# Patient Record
Sex: FEMALE | Race: BLACK OR AFRICAN AMERICAN | Employment: FULL TIME | URBAN - METROPOLITAN AREA
[De-identification: names, ages, dates, MRNs, and addresses within clinical notes are randomized per-mention and may not be internally consistent; named-entity substitution may affect disease eponyms.]

---

## 2020-10-30 ENCOUNTER — TRANSCRIBE ORDER (OUTPATIENT)
Dept: SCHEDULING | Age: 50
End: 2020-10-30

## 2020-10-30 DIAGNOSIS — S86.019A ACHILLES TENDON TEAR: Primary | ICD-10-CM

## 2020-11-18 ENCOUNTER — HOSPITAL ENCOUNTER (OUTPATIENT)
Dept: MRI IMAGING | Age: 50
Discharge: HOME | End: 2020-11-18
Attending: PODIATRIST
Payer: COMMERCIAL

## 2020-11-18 DIAGNOSIS — S86.019A ACHILLES TENDON TEAR: ICD-10-CM

## 2020-11-18 PROCEDURE — 73721 MRI JNT OF LWR EXTRE W/O DYE: CPT

## 2021-03-01 ENCOUNTER — OFFICE VISIT (OUTPATIENT)
Dept: PRIMARY CARE CLINIC | Age: 51
End: 2021-03-01
Payer: COMMERCIAL

## 2021-03-01 VITALS
RESPIRATION RATE: 18 BRPM | TEMPERATURE: 97.5 F | HEIGHT: 65 IN | SYSTOLIC BLOOD PRESSURE: 120 MMHG | BODY MASS INDEX: 43.15 KG/M2 | WEIGHT: 259 LBS | DIASTOLIC BLOOD PRESSURE: 79 MMHG | OXYGEN SATURATION: 97 % | HEART RATE: 67 BPM

## 2021-03-01 DIAGNOSIS — E66.01 MORBIDLY OBESE (HCC): ICD-10-CM

## 2021-03-01 DIAGNOSIS — Z12.11 COLON CANCER SCREENING: ICD-10-CM

## 2021-03-01 DIAGNOSIS — S86.012D COMPLETE RUPTURE OF ACHILLES TENDON, LEFT, SUBSEQUENT ENCOUNTER: Primary | ICD-10-CM

## 2021-03-01 DIAGNOSIS — Z12.31 ENCOUNTER FOR SCREENING MAMMOGRAM FOR MALIGNANT NEOPLASM OF BREAST: ICD-10-CM

## 2021-03-01 DIAGNOSIS — Z01.818 PRE-OP EXAM: ICD-10-CM

## 2021-03-01 DIAGNOSIS — Z11.59 NEED FOR HEPATITIS C SCREENING TEST: ICD-10-CM

## 2021-03-01 PROCEDURE — 99204 OFFICE O/P NEW MOD 45 MIN: CPT | Performed by: INTERNAL MEDICINE

## 2021-03-01 RX ORDER — DICLOFENAC SODIUM 75 MG/1
1 TABLET, DELAYED RELEASE ORAL 2 TIMES DAILY
COMMUNITY
Start: 2021-02-15 | End: 2022-02-22

## 2021-03-01 NOTE — PROGRESS NOTES
Written by Davonte Ruiz, as dictated by Dr. Zhanna Ross MD.    Bibi Melendez (: 1970) is a 50 y.o. female, new patient, here for evaluation of the following chief complaint(s):  Establish Care (labs) and Pre-op Exam       ASSESSMENT/PLAN:  1. Complete rupture of Achilles tendon, left, subsequent encounter   She is scheduled for surgery on 21 and I have cleared her for surgery.    2. Pre-op exam  -     METABOLIC PANEL, COMPREHENSIVE; Future  -     CBC W/O DIFF; Future  -     LIPID PANEL; Future  -     TSH 3RD GENERATION; Future   She is scheduled for 21 and I have cleared her for surgery. I instructed her to stop taking diclofenac 7 days before her procedure. She can take Tylenol instead PRN. I let her know that any pain medications prescribed to her after the surgery may make her constipated, so she should take a stool softener along with them.     3. Morbidly obese (HCC)  She would like to work on weight loss so I recommended she start by cutting back on her refined carbohydrates such as bread, pasta, rice, and sweets. It will be difficult for her to be active while she is recovering from surgery.     4. Need for hepatitis C screening test  -     HEPATITIS C AB; Future  -     HEPATITIS C AB  I ordered hepatitis C labs for health maintenance.      5. Encounter for screening mammogram for malignant neoplasm of breast  -     [facility] 3D JASVIR W MAMMO BI SCREENING INCL CAD; Future  I ordered a mammogram for health maintenance.      6. Colon cancer screening  -     OCCULT BLOOD IMMUNOASSAY,DIAGNOSTIC; Future  I ordered a stool test for health maintenance.     No follow-ups on file.      SUBJECTIVE/OBJECTIVE:  HPI  Pt presents today to establish care, having previously been under the care of Dr. Devi Weathers several years ago. She has not seen a PCP in years and is not taking any medications besides diclofenac for pain in her R foot.    She is also here for a pre-op exam for repair of her R  Achilles tendon. She has been having trouble with this as a bone spur in her heel is rubbing against it and damaging it. She has been around her current weight of 259 lb for years, and she would like to work on losing weight. Her greatest struggles with food are bread, potatoes (i.e. french fries), and soda. She had her last Pap smear 2-3 years ago with normal findings and her last mammogram was around the same time. She has fhx of breast cancer (grandmother and aunt). She has not had a colonoscopy done. She does not get flu vaccines but her most recent Tdap vaccine was in 2019 when she had a puncture wound in her hand from a nail. Current Outpatient Medications on File Prior to Visit   Medication Sig Dispense Refill    diclofenac EC (VOLTAREN) 75 mg EC tablet Take 1 Tab by mouth two (2) times a day. No current facility-administered medications on file prior to visit.         No Known Allergies    Past Medical History:   Diagnosis Date    UTI (urinary tract infection)        Past Surgical History:   Procedure Laterality Date    HX HYSTERECTOMY      [place] BREAST SURGERY PROCEDURE UNLISTED  1998    breast reduction       Family History   Problem Relation Age of Onset    Cancer Maternal Aunt     Heart Disease Maternal Grandmother     Cancer Maternal Grandmother        Social History     Socioeconomic History    Marital status: SINGLE     Spouse name: Not on file    Number of children: Not on file    Years of education: Not on file    Highest education level: Not on file   Occupational History    Not on file   Social Needs    Financial resource strain: Not on file    Food insecurity     Worry: Not on file     Inability: Not on file    Transportation needs     Medical: Not on file     Non-medical: Not on file   Tobacco Use    Smoking status: Never Smoker    Smokeless tobacco: Never Used   Substance and Sexual Activity    Alcohol use: Yes     Frequency: Monthly or less     Drinks per session: 1 or 2     Binge frequency: Never    Drug use: Never    Sexual activity: Not on file   Lifestyle    Physical activity     Days per week: Not on file     Minutes per session: Not on file    Stress: Not on file   Relationships    Social connections     Talks on phone: Not on file     Gets together: Not on file     Attends Protestant service: Not on file     Active member of club or organization: Not on file     Attends meetings of clubs or organizations: Not on file     Relationship status: Not on file    Intimate partner violence     Fear of current or ex partner: Not on file     Emotionally abused: Not on file     Physically abused: Not on file     Forced sexual activity: Not on file   Other Topics Concern    Not on file   Social History Narrative    Not on file       No visits with results within 3 Month(s) from this visit.    Latest known visit with results is:   Admission on 04/24/2013, Discharged on 04/24/2013   Component Date Value Ref Range Status    Color 04/24/2013 YELLOW   Final    Appearance 04/24/2013 CLEAR   Final    Specific gravity 04/24/2013 1.012  1.003 - 1.030   Final    pH (UA) 04/24/2013 6.0  5.0 - 8.0   Final    Protein 04/24/2013 NEGATIVE   NEGATIVE MG/DL Final    Glucose 04/24/2013 NEGATIVE   NEGATIVE MG/DL Final    Ketone 04/24/2013 NEGATIVE   NEGATIVE MG/DL Final    Bilirubin 04/24/2013 NEGATIVE   NEGATIVE Final    Blood 04/24/2013 SMALL* NEGATIVE Final    Urobilinogen 04/24/2013 1.0  0.2 - 1.0 EU/DL Final    Nitrites 04/24/2013 NEGATIVE   NEGATIVE Final    Leukocyte Esterase 04/24/2013 NEGATIVE   NEGATIVE Final    UA:UC IF INDICATED 04/24/2013 CULTURE NOT INDICATED BY UA RESULT   Final    WBC 04/24/2013 0-4  0 - 4 /HPF Final    RBC 04/24/2013 0-3  0 - 5 /HPF Final    Epithelial cells 04/24/2013 0-5  0 - 5 /LPF Final    Bacteria 04/24/2013 NEGATIVE   NEGATIVE /HPF Final    Hyaline cast 04/24/2013 0-2  0 - 2 Final     Review of Systems   Constitutional: Negative for activity change, fatigue and unexpected weight change. HENT: Negative for congestion, hearing loss, rhinorrhea and sore throat. Eyes: Negative for discharge. Respiratory: Negative for cough, chest tightness and shortness of breath. Cardiovascular: Negative for leg swelling. Gastrointestinal: Negative for abdominal pain, constipation and diarrhea. Genitourinary: Negative for dysuria, flank pain, frequency and urgency. Musculoskeletal: Negative for arthralgias, back pain and myalgias. Skin: Negative for color change and rash. Neurological: Negative for dizziness, light-headedness and headaches. Psychiatric/Behavioral: Negative for dysphoric mood and sleep disturbance. The patient is not nervous/anxious. Visit Vitals  /79 (BP 1 Location: Left upper arm, BP Patient Position: Sitting)   Pulse 67   Temp 97.5 °F (36.4 °C) (Temporal)   Resp 18   Ht 5' 5\" (1.651 m)   Wt 259 lb (117.5 kg)   SpO2 97%   BMI 43.10 kg/m²     Physical Exam  Vitals signs and nursing note reviewed. Constitutional:       General: She is not in acute distress. Appearance: Normal appearance. She is normal weight. She is not diaphoretic. HENT:      Right Ear: Tympanic membrane, ear canal and external ear normal.      Left Ear: Tympanic membrane, ear canal and external ear normal.      Mouth/Throat:      Mouth: Mucous membranes are moist.      Pharynx: Oropharynx is clear. Eyes:      General:         Right eye: No discharge. Left eye: No discharge. Extraocular Movements: Extraocular movements intact. Conjunctiva/sclera: Conjunctivae normal.   Neck:      Thyroid: No thyromegaly. Cardiovascular:      Rate and Rhythm: Normal rate and regular rhythm. Pulses: Normal pulses. Dorsalis pedis pulses are 2+ on the right side and 2+ on the left side. Pulmonary:      Effort: Pulmonary effort is normal.      Breath sounds: Normal breath sounds. No wheezing.    Abdominal: General: Bowel sounds are normal. There is no distension. Palpations: Abdomen is soft. Tenderness: There is no abdominal tenderness. Musculoskeletal:      Right lower leg: No edema. Left lower leg: No edema. Comments: B/L knees without crepitus   Lymphadenopathy:      Cervical: No cervical adenopathy. Neurological:      Deep Tendon Reflexes:      Reflex Scores:       Patellar reflexes are 2+ on the right side and 2+ on the left side. Psychiatric:         Mood and Affect: Mood and affect normal.         An electronic signature was used to authenticate this note.   -- Miya Chapman

## 2021-03-02 LAB
ALBUMIN SERPL-MCNC: 4.6 G/DL (ref 3.8–4.8)
ALBUMIN/GLOB SERPL: 1.6 {RATIO} (ref 1.2–2.2)
ALP SERPL-CCNC: 90 IU/L (ref 39–117)
ALT SERPL-CCNC: 16 IU/L (ref 0–32)
AST SERPL-CCNC: 20 IU/L (ref 0–40)
BILIRUB SERPL-MCNC: 0.6 MG/DL (ref 0–1.2)
BUN SERPL-MCNC: 13 MG/DL (ref 6–24)
BUN/CREAT SERPL: 21 (ref 9–23)
CALCIUM SERPL-MCNC: 10.2 MG/DL (ref 8.7–10.2)
CHLORIDE SERPL-SCNC: 105 MMOL/L (ref 96–106)
CHOLEST SERPL-MCNC: 200 MG/DL (ref 100–199)
CO2 SERPL-SCNC: 22 MMOL/L (ref 20–29)
CREAT SERPL-MCNC: 0.63 MG/DL (ref 0.57–1)
ERYTHROCYTE [DISTWIDTH] IN BLOOD BY AUTOMATED COUNT: 13.8 % (ref 11.7–15.4)
GLOBULIN SER CALC-MCNC: 2.8 G/DL (ref 1.5–4.5)
GLUCOSE SERPL-MCNC: 93 MG/DL (ref 65–99)
HCT VFR BLD AUTO: 39.9 % (ref 34–46.6)
HCV AB S/CO SERPL IA: <0.1 S/CO RATIO (ref 0–0.9)
HDLC SERPL-MCNC: 40 MG/DL
HGB BLD-MCNC: 13.1 G/DL (ref 11.1–15.9)
LDLC SERPL CALC-MCNC: 140 MG/DL (ref 0–99)
MCH RBC QN AUTO: 25.4 PG (ref 26.6–33)
MCHC RBC AUTO-ENTMCNC: 32.8 G/DL (ref 31.5–35.7)
MCV RBC AUTO: 78 FL (ref 79–97)
PLATELET # BLD AUTO: 251 X10E3/UL (ref 150–450)
POTASSIUM SERPL-SCNC: 4.1 MMOL/L (ref 3.5–5.2)
PROT SERPL-MCNC: 7.4 G/DL (ref 6–8.5)
RBC # BLD AUTO: 5.15 X10E6/UL (ref 3.77–5.28)
SODIUM SERPL-SCNC: 142 MMOL/L (ref 134–144)
TRIGL SERPL-MCNC: 111 MG/DL (ref 0–149)
TSH SERPL DL<=0.005 MIU/L-ACNC: 1.53 UIU/ML (ref 0.45–4.5)
VLDLC SERPL CALC-MCNC: 20 MG/DL (ref 5–40)
WBC # BLD AUTO: 5.2 X10E3/UL (ref 3.4–10.8)

## 2021-03-06 NOTE — PROGRESS NOTES
Nicole Jimenes, hope you are doing well. Your blood report is back and Hemoglobin came back in normal range. Cholesterol came borderline elevated. I would suggest low carb diet for now as you won`t be able to do exercise after your surgery. Let me know how surgery goes?

## 2021-04-28 ENCOUNTER — HOSPITAL ENCOUNTER (OUTPATIENT)
Dept: MAMMOGRAPHY | Age: 51
Discharge: HOME | End: 2021-04-28
Attending: INTERNAL MEDICINE
Payer: COMMERCIAL

## 2021-04-28 DIAGNOSIS — Z12.31 ENCOUNTER FOR SCREENING MAMMOGRAM FOR MALIGNANT NEOPLASM OF BREAST: ICD-10-CM

## 2021-04-28 PROCEDURE — 77063 BREAST TOMOSYNTHESIS BI: CPT

## 2022-02-22 ENCOUNTER — OFFICE VISIT (OUTPATIENT)
Dept: PRIMARY CARE CLINIC | Age: 52
End: 2022-02-22
Payer: COMMERCIAL

## 2022-02-22 VITALS
BODY MASS INDEX: 42.32 KG/M2 | OXYGEN SATURATION: 98 % | SYSTOLIC BLOOD PRESSURE: 119 MMHG | HEART RATE: 74 BPM | DIASTOLIC BLOOD PRESSURE: 77 MMHG | HEIGHT: 65 IN | WEIGHT: 254 LBS | RESPIRATION RATE: 18 BRPM | TEMPERATURE: 97.5 F

## 2022-02-22 DIAGNOSIS — E66.01 OBESITY, CLASS III, BMI 40-49.9 (MORBID OBESITY) (HCC): ICD-10-CM

## 2022-02-22 DIAGNOSIS — Z01.818 PRE-OP EXAM: ICD-10-CM

## 2022-02-22 DIAGNOSIS — M67.90 TENDINOPATHY: ICD-10-CM

## 2022-02-22 DIAGNOSIS — M77.31 HEEL SPUR, RIGHT: Primary | ICD-10-CM

## 2022-02-22 PROCEDURE — 99213 OFFICE O/P EST LOW 20 MIN: CPT | Performed by: INTERNAL MEDICINE

## 2022-02-22 NOTE — PROGRESS NOTES
Preoperative Evaluation    Date of Exam: 2022    Denia Jeffers is a 46 y.o. female (:1970) who presents for preoperative evaluation. Latex Allergy: no    Medical History:     Past Medical History:   Diagnosis Date    UTI (urinary tract infection)      Allergies:   No Known Allergies   Medications:     No current outpatient medications on file. No current facility-administered medications for this visit. Surgical History:     Past Surgical History:   Procedure Laterality Date    HX BREAST REDUCTION Bilateral     HX HYSTERECTOMY      [place] BREAST SURGERY PROCEDURE UNLISTED  1998    breast reduction     Social History:     Social History     Socioeconomic History    Marital status: SINGLE   Tobacco Use    Smoking status: Never Smoker    Smokeless tobacco: Never Used   Vaping Use    Vaping Use: Never used   Substance and Sexual Activity    Alcohol use: Yes    Drug use: Never       Anesthesia Complications: None  History of abnormal bleeding : None  History of Blood Transfusions: no  Health Care Directive or Living Will: no    Objective:     ROS:   . No dyspnea or chest pain on exertion. No abdominal pain, change in bowel habits, black or bloody stools. No urinary tract symptoms. GYN ROS:  no abnormal bleeding, pelvic pain or discharge\",\"no breast pain . No neurological complaints. OBJECTIVE:   The patient appears well, alert, oriented x 3, in no distress. Visit Vitals  /77 (BP 1 Location: Left upper arm, BP Patient Position: Sitting)   Pulse 74   Temp 97.5 °F (36.4 °C) (Temporal)   Resp 18   Ht 5' 5\" (1.651 m)   Wt 254 lb (115.2 kg)   SpO2 98%   BMI 42.27 kg/m²     HEENT:ENT normal.  Neck supple. No adenopathy or thyromegaly. PAUL. Chest: Lungs are clear, good air entry, no wheezes, rhonchi or rales. Cardiovascular: S1 and S2 normal, no murmurs, regular rate and rhythm. Abdomen: soft without tenderness, guarding, mass or organomegaly.    Extremities: show no edema, normal peripheral pulses. Neurological: is normal, no focal findings. DIAGNOSTICS:   3. Labs: CBC & CMP ordered. IMPRESSION:   Diagnoses and all orders for this visit:    Heel spur, right  Scheduled for Right calcaneus osteotomy on March 11th. Tendinopathy  Scheduled for Javan deformity correction same day     Pre-op exam  Cleared for the procedure. No ASA, NSAIDS 5 days before the procedure. -     METABOLIC PANEL, COMPREHENSIVE; Future  -     CBC W/O DIFF; Future      Obesity, Class III, BMI 40-49.9 (morbid obesity) ([facility])   Recommended cutting down on Carbs and sweets.         Lauren Harris MD   2/22/2022

## 2022-02-23 LAB
ALBUMIN SERPL-MCNC: 4.6 G/DL (ref 3.8–4.9)
ALBUMIN/GLOB SERPL: 1.5 {RATIO} (ref 1.2–2.2)
ALP SERPL-CCNC: 105 IU/L (ref 44–121)
ALT SERPL-CCNC: 18 IU/L (ref 0–32)
AST SERPL-CCNC: 19 IU/L (ref 0–40)
BILIRUB SERPL-MCNC: 0.7 MG/DL (ref 0–1.2)
BUN SERPL-MCNC: 11 MG/DL (ref 6–24)
BUN/CREAT SERPL: 16 (ref 9–23)
CALCIUM SERPL-MCNC: 10.4 MG/DL (ref 8.7–10.2)
CHLORIDE SERPL-SCNC: 105 MMOL/L (ref 96–106)
CO2 SERPL-SCNC: 25 MMOL/L (ref 20–29)
CREAT SERPL-MCNC: 0.69 MG/DL (ref 0.57–1)
ERYTHROCYTE [DISTWIDTH] IN BLOOD BY AUTOMATED COUNT: 13.5 % (ref 11.7–15.4)
GLOBULIN SER CALC-MCNC: 3 G/DL (ref 1.5–4.5)
GLUCOSE SERPL-MCNC: 95 MG/DL (ref 65–99)
HCT VFR BLD AUTO: 38.1 % (ref 34–46.6)
HGB BLD-MCNC: 12.3 G/DL (ref 11.1–15.9)
MCH RBC QN AUTO: 25.5 PG (ref 26.6–33)
MCHC RBC AUTO-ENTMCNC: 32.3 G/DL (ref 31.5–35.7)
MCV RBC AUTO: 79 FL (ref 79–97)
PLATELET # BLD AUTO: 257 X10E3/UL (ref 150–450)
POTASSIUM SERPL-SCNC: 4.1 MMOL/L (ref 3.5–5.2)
PROT SERPL-MCNC: 7.6 G/DL (ref 6–8.5)
RBC # BLD AUTO: 4.83 X10E6/UL (ref 3.77–5.28)
SODIUM SERPL-SCNC: 145 MMOL/L (ref 134–144)
WBC # BLD AUTO: 6.5 X10E3/UL (ref 3.4–10.8)

## 2022-02-24 NOTE — PROGRESS NOTES
Please let her know blood work showed she is dehydrated. Needs to drink 7-8 glasses of water daily. Also, we need to fax this blood report to surgeon office.

## 2022-04-04 ENCOUNTER — TRANSCRIBE ORDER (OUTPATIENT)
Dept: SCHEDULING | Age: 52
End: 2022-04-04

## 2022-04-04 DIAGNOSIS — Z12.31 SCREENING MAMMOGRAM FOR HIGH-RISK PATIENT: Primary | ICD-10-CM

## 2022-04-05 ENCOUNTER — OFFICE VISIT (OUTPATIENT)
Dept: PRIMARY CARE CLINIC | Age: 52
End: 2022-04-05
Payer: COMMERCIAL

## 2022-04-05 VITALS
HEART RATE: 70 BPM | TEMPERATURE: 97.1 F | RESPIRATION RATE: 20 BRPM | SYSTOLIC BLOOD PRESSURE: 114 MMHG | OXYGEN SATURATION: 98 % | DIASTOLIC BLOOD PRESSURE: 74 MMHG

## 2022-04-05 DIAGNOSIS — N83.8 OVARIAN MASS, RIGHT: ICD-10-CM

## 2022-04-05 DIAGNOSIS — R10.2 PELVIC PAIN: Primary | ICD-10-CM

## 2022-04-05 PROCEDURE — 99213 OFFICE O/P EST LOW 20 MIN: CPT | Performed by: INTERNAL MEDICINE

## 2022-04-05 NOTE — PROGRESS NOTES
Daniela Mcfarlane is a 46 y.o.  female and presents with     Chief Complaint   Patient presents with    Abdominal Pain     lower abdominal pain x 2 months      Pt has been having rt lower tenderness for past 2 months. Pt has to lift boxes at work and so she thought it could be related to that . However she had foot surgery on March 11 and has been home and still continues to get the pain in rt lower quadrant. It is  A constant pain  No nchills or fevers. NO constipation. Has BM once or twice daily. No nausea, vomiting  Had hysterectomy  And left oophorectomy In 2009 for uterine fibroid . HOwever subsequent ultrasound showed rt ovarian mass. Pt does not remember if she went for follow up or had MRI as recommended             Past Medical History:   Diagnosis Date    UTI (urinary tract infection)      Past Surgical History:   Procedure Laterality Date    HX BREAST REDUCTION Bilateral 1988    HX HYSTERECTOMY  1993    DE BREAST SURGERY PROCEDURE UNLISTED  1998    breast reduction     No current outpatient medications on file. No current facility-administered medications for this visit. Health Maintenance   Topic Date Due    Colorectal Cancer Screening Combo  Never done    Shingrix Vaccine Age 49> (1 of 2) Never done    Flu Vaccine (Season Ended) 10/21/2025 (Originally 9/1/2022)    Depression Screen  02/22/2023    Breast Cancer Screen Mammogram  04/28/2023    Lipid Screen  03/01/2026    DTaP/Tdap/Td series (2 - Td or Tdap) 09/18/2029    Hepatitis C Screening  Completed    COVID-19 Vaccine  Completed    Pneumococcal 0-64 years  Aged [employer] History   Administered Date(s) Administered    COVID-19, Pfizer Purple top, DILUTE for use, 12+ yrs, 30mcg/0.3mL dose 04/25/2021, 05/16/2021, 01/30/2022    Tdap 09/18/2019     No LMP recorded. Patient has had a hysterectomy.         Allergies and Intolerances:   No Known Allergies    Family History:   Family History   Problem Relation Age of Onset    Cancer Maternal Aunt     Breast Cancer Maternal Aunt         M. Aunt diagnosed in her early 63's    Heart Disease Maternal Grandmother     Cancer Maternal Grandmother     Breast Cancer Maternal Grandmother         Age at diagnosis unknown        Social History:   She  reports that she has never smoked. She has never used smokeless tobacco.  She  reports current alcohol use. Review of Systems:   General: negative for - chills, fatigue, fever, weight change  Psych: negative for - anxiety, depression, irritability or mood swings  ENT: negative for - headaches, hearing change, nasal congestion, oral lesions, sneezing or sore throat  Heme/ Lymph: negative for - bleeding problems, bruising, pallor or swollen lymph nodes  Endo: negative for - hot flashes, polydipsia/polyuria or temperature intolerance  Resp: negative for - cough, shortness of breath or wheezing  CV: negative for - chest pain, edema or palpitations  GI: negative for - abdominal pain, change in bowel habits, constipation, diarrhea or nausea/vomiting  : negative for - dysuria, hematuria, incontinence, pelvic pain or vulvar/vaginal symptoms  MSK: negative for - joint pain, joint swelling or muscle pain  Neuro: negative for - confusion, headaches, seizures or weakness  Derm: negative for - dry skin, hair changes, rash or skin lesion changes          Physical:   Vitals:   Vitals:    04/05/22 1244   BP: 114/74   Pulse: 70   Resp: 20   Temp: 97.1 °F (36.2 °C)   TempSrc: Temporal   SpO2: 98%           Exam:   HEENT- atraumatic,normocephalic, awake, oriented, well nourished  Neck - supple,no enlarged lymph nodes, no JVD, no thyromegaly  Chest- CTA, no rhonchi, no crackles  Heart- rrr, no murmurs / gallop/rub  Abdomen- soft,BS+,NT, no hepatosplenomegaly, rt sided pelvic tenderenss ++  Ext - no c/c/edema   Neuro- no focal deficits. Power 5/5 all extremities  Skin - warm,dry, no obvious rashes.           Review of Data:   LABS:   Lab Results Component Value Date/Time    WBC 6.5 02/22/2022 04:12 AM    HGB 12.3 02/22/2022 04:12 AM    HCT 38.1 02/22/2022 04:12 AM    PLATELET 868 53/44/1728 04:12 AM     Lab Results   Component Value Date/Time    Sodium 145 (H) 02/22/2022 04:12 AM    Potassium 4.1 02/22/2022 04:12 AM    Chloride 105 02/22/2022 04:12 AM    CO2 25 02/22/2022 04:12 AM    Glucose 95 02/22/2022 04:12 AM    BUN 11 02/22/2022 04:12 AM    Creatinine 0.69 02/22/2022 04:12 AM     Lab Results   Component Value Date/Time    Cholesterol, total 200 (H) 03/01/2021 12:00 AM    HDL Cholesterol 40 03/01/2021 12:00 AM    LDL, calculated 140 (H) 03/01/2021 12:00 AM    Triglyceride 111 03/01/2021 12:00 AM     No components found for: GPT        Impression / Plan:        ICD-10-CM ICD-9-CM    1. Pelvic pain  R10.2 [id number] US PELV NON OBS   2. Ovarian mass, right  N83.8 620.8 US PELV NON OBS         Explained to patient risk benefits of the medications. Advised patient to stop meds if having any side effects. Pt verbalized understanding of the instructions. I have discussed the diagnosis with the patient and the intended plan as seen in the above orders. The patient has received an after-visit summary and questions were answered concerning future plans. I have discussed medication side effects and warnings with the patient as well. I have reviewed the plan of care with the patient, accepted their input and they are in agreement with the treatment goals. Reviewed plan of care. Patient has provided input and agrees with goals. Follow-up and Dispositions    · Return in about 6 weeks (around 5/17/2022), or Dr Koko Bridges.          Yudith Grimes MD

## 2022-04-11 ENCOUNTER — HOSPITAL ENCOUNTER (OUTPATIENT)
Dept: ULTRASOUND IMAGING | Age: 52
Discharge: HOME | End: 2022-04-11
Attending: INTERNAL MEDICINE
Payer: COMMERCIAL

## 2022-04-11 DIAGNOSIS — N83.8 OVARIAN MASS, RIGHT: ICD-10-CM

## 2022-04-11 DIAGNOSIS — R10.2 PELVIC PAIN: ICD-10-CM

## 2022-04-11 PROCEDURE — 76830 TRANSVAGINAL US NON-OB: CPT

## 2022-04-11 PROCEDURE — 76856 US EXAM PELVIC COMPLETE: CPT

## 2022-04-12 NOTE — PROGRESS NOTES
Right ovary is normal.  There is no cyst.  Right-sided neck pain could be muscular  If you  still have pain we may need to do a CT scan of the pelvis to rule out appendicitis.

## 2022-04-15 ENCOUNTER — TELEPHONE (OUTPATIENT)
Dept: PRIMARY CARE CLINIC | Age: 52
End: 2022-04-15

## 2022-04-15 NOTE — TELEPHONE ENCOUNTER
Returned call to patient. In mid conversation the call dropped.  Tried to call back phone goes straight to voice mail

## 2022-04-15 NOTE — TELEPHONE ENCOUNTER
Pt states she had an US done this week and has seen the results. Wants to know what the next steps are since she is still having side pain?

## 2022-05-22 PROBLEM — M17.11 OSTEOARTHRITIS OF RIGHT KNEE: Status: ACTIVE | Noted: 2022-05-22

## 2022-05-22 NOTE — PROGRESS NOTES
ASSESSMENT/PLAN:  Below is the assessment and plan developed based on review of pertinent history, physical exam, labs, studies, and medications. 1. Osteoarthritis of right knee, unspecified osteoarthritis type  -     XR KNEE RT MIN 4 V; Future      Return in about 3 months (around 8/23/2022). In discussion with the patient, we considered the numerus possible diagnoses that could be contributing to their present symptoms. We also deliberated on the extensive management options that must be considered to treat their current condition. We reviewed their accessible prior medical records, diagnostic tests, and current health and employment information. We considered how these symptoms were affecting the patient´s activities of daily living as well as employment and fitness activities. The patient had various questions regarding the possible risks, benefits, complications, morbidity and mortality regarding their diagnosis and treatment options. The patients´ comorbidities were considered, and I advocated that they consider maximizing lifestyle modification through nutrition and exercise to aid in addressing their symptoms. Shared decision making yielded an understanding to move forward with conservation treatment preferences. The patient expressed understanding that if conservative management fails to alleviate the present symptoms they will return to office for re-evaluation and consideration of additional diagnostic tests and potential surgical options. In the interim, we have recommended ice, elevation, and take prescription anti-inflammatory medications along with a physician directed home exercise program. We discussed the risks and common side effects of anti-inflammatory medications and instructed the patient to discontinue the medication and contact us if they experienced any side effects.  The patient was encouraged to discuss the possible side effects with their family physician or pharmacist prior to initiating any new medications. We discussed the fact that many of the recommended treatment options presented are significantly limited by the patient´s social determinants of health. We also reviewed the circumstances surrounding the environment that they live and work which affect a wide range of health risk. We considered the limited access to appropriate educational resources regarding proper nutrition and exercise as well as the economic and social support necessary to maintain health and wellbeing. We discussed the possibility of an injection of a steroid mixed with a local anesthetic to relieve pain and decrease inflammation in the right knee. The risks of a steroid injection which include but are not limited to cartilage damage, death of nearby bone, joint infection, nerve damage, temporary facial flushing, temporary steroid flare of pain and inflammation in the joint, temporary increase in blood sugar, tendon weakening or rupture, thinning of nearby bone (osteoporosis), thinning of skin and soft tissue around the injection site, and whitening or lightening of the skin around the injection site were reviewed at length. We specifically advised that patients with diabetes talk to their primary care to ensure they are safe for a steroid injection due to the transient increase in blood sugar associated with the injection. We discussed the chance of increased bleeding and bruising if the patient is on blood thinners or certain dietary supplements that have a blood-thinning effect. We advised patients that have an active infection, history of allergic reactions to steroids or take medications that may prohibit them from receiving a steroid injection to talk to their primary care physician before receiving and injection. We also talked about limiting the number of injections because these potential side effects increase with larger doses and repeated use.     After explaining the risks and benefits of the procedure and obtaining verbal informed consent from the patient, the proposed area for injection was confirmed with the patient. After all questions and concerns were addressed, the skin was prepped with alcohol to reduce the chances of infection. The skin was anesthetized with a topical ethylene chloride spray and a mixture of two milliliters of Depo-Medrol (40mg/ml), one milliliter of Lidocaine and one milliliter of Marcaine was injected slowly into the intra-articular space of right knee in a sterile fashion without difficulty. The needle was removed and disposed of in a sterile container. The patient tolerated the injection well and a band-aid was placed on the skin. The patient was counseled on protecting the injection area, avoiding water submersion for 2 days, icing for pain relief and looking for signs and symptoms of infection. We requested that the patient contact us if any symptoms persist greater than 48 hours after the injection. I have encouraged the patient to take an active role in their treatment by pursuing a healthy lifestyle with better nutritional choices and regular low impact exercise. We discussed that weight loss can be beneficial to relieving discomfort in the lower extremities as well as other health benefits. I have asked the patient to seek advice from their primary care physician regarding additional resources available to achieve their weight loss goals. SUBJECTIVE/OBJECTIVE:  Kareen Braga (: 1970) is a 46 y.o. female, patient,here for evaluation of the Knee Pain (right knee)  . Ms. Radha Bustamante returns today for follow-up of her right knee. An MRI a year ago that showed a medial meniscus tear as well as some patellofemoral arthritis. He reports is continue to bother intermittently. She is continue to ice and elevate as well as take some anti-inflammatory medications. She did have a steroid injection last summer which was very helpful for her.  She is working primarily on weight loss. Recovering from Achilles surgery. Reports she has a dull ache. She reports she occasionally has popping and clicking. She has difficulty kneeling and squatting. She denies any true give way. Physical Exam    Upon physical examination, the patient is well developed, well nourished, alert and oriented times three, with normal mood and affect and walks with an antalgic gait. Upon examination of the right knee, the patient is tender to palpation along the medial joint line, and has an effusion. They have crepitus of the patellofemoral joint with range of motion and discomfort with patella grind testing. The patient has discomfort with Thor´s maneuvers, and the knee is stable. They lack full flexion secondary to the effusion, but have full extension. They have 5/5 strength, and are neurovascularly intact distally. There is no erythema, warmth or skin lesions present. On examination of the contralateral extremity, the patient is nontender to palpation and has excellent range of motion, stability and strength. Imagin views of the right knee demonstrate osteoarthritis in the patellofemoral joint considerably the lateral facet. No Known Allergies    Current Outpatient Medications   Medication Sig    gabapentin (NEURONTIN) 300 mg capsule TAKE 1 CAPSULE AT HOUR OF SLEEP    ibuprofen (MOTRIN) 600 mg tablet TAKE 1 TABLET BY MOUTH THREE TIMES DAILY AFTER A MEAL     No current facility-administered medications for this visit. Past Medical History:   Diagnosis Date    UTI (urinary tract infection)        Past Surgical History:   Procedure Laterality Date    FOOT/TOES SURGERY PROC UNLISTED      HX BREAST REDUCTION Bilateral     HX HYSTERECTOMY      WA BREAST SURGERY PROCEDURE UNLISTED      breast reduction       Family History   Problem Relation Age of Onset    Cancer Maternal Aunt     Breast Cancer Maternal Aunt         M.  Aunt diagnosed in her early 63's    Heart Disease Maternal Grandmother     Cancer Maternal Grandmother     Breast Cancer Maternal Grandmother         Age at diagnosis unknown        Social History     Socioeconomic History    Marital status: SINGLE     Spouse name: Not on file    Number of children: Not on file    Years of education: Not on file    Highest education level: Not on file   Occupational History    Not on file   Tobacco Use    Smoking status: Never Smoker    Smokeless tobacco: Never Used   Vaping Use    Vaping Use: Never used   Substance and Sexual Activity    Alcohol use: Yes    Drug use: Never    Sexual activity: Not on file   Other Topics Concern    Not on file   Social History Narrative    Not on file     Social Determinants of Health     Financial Resource Strain:     Difficulty of Paying Living Expenses: Not on file   Food Insecurity:     Worried About Running Out of Food in the Last Year: Not on file    Salvatore of Food in the Last Year: Not on file   Transportation Needs:     Lack of Transportation (Medical): Not on file    Lack of Transportation (Non-Medical):  Not on file   Physical Activity:     Days of Exercise per Week: Not on file    Minutes of Exercise per Session: Not on file   Stress:     Feeling of Stress : Not on file   Social Connections:     Frequency of Communication with Friends and Family: Not on file    Frequency of Social Gatherings with Friends and Family: Not on file    Attends Buddhism Services: Not on file    Active Member of Clubs or Organizations: Not on file    Attends Club or Organization Meetings: Not on file    Marital Status: Not on file   Intimate Partner Violence:     Fear of Current or Ex-Partner: Not on file    Emotionally Abused: Not on file    Physically Abused: Not on file    Sexually Abused: Not on file   Housing Stability:     Unable to Pay for Housing in the Last Year: Not on file    Number of [place] in the Last Year: Not on file    Unstable Housing in the Last Year: Not on file       Review of Systems    No flowsheet data found. Vitals:  Ht 5' 5\" (1.651 m)   Wt 254 lb (115.2 kg)   BMI 42.27 kg/m²    Body mass index is 42.27 kg/m². An electronic signature was used to authenticate this note.   -- Chayito Rios MD

## 2022-05-23 ENCOUNTER — OFFICE VISIT (OUTPATIENT)
Dept: ORTHOPEDIC SURGERY | Age: 52
End: 2022-05-23
Payer: COMMERCIAL

## 2022-05-23 VITALS — BODY MASS INDEX: 42.32 KG/M2 | WEIGHT: 254 LBS | HEIGHT: 65 IN

## 2022-05-23 DIAGNOSIS — M17.11 OSTEOARTHRITIS OF RIGHT KNEE, UNSPECIFIED OSTEOARTHRITIS TYPE: Primary | ICD-10-CM

## 2022-05-23 PROCEDURE — 20610 DRAIN/INJ JOINT/BURSA W/O US: CPT | Performed by: ORTHOPAEDIC SURGERY

## 2022-05-23 RX ORDER — TRIAMCINOLONE ACETONIDE 40 MG/ML
40 INJECTION, SUSPENSION INTRA-ARTICULAR; INTRAMUSCULAR ONCE
Status: COMPLETED | OUTPATIENT
Start: 2022-05-23 | End: 2022-05-23

## 2022-05-23 RX ORDER — BUPIVACAINE HYDROCHLORIDE 5 MG/ML
2 INJECTION, SOLUTION EPIDURAL; INTRACAUDAL ONCE
Status: DISCONTINUED | OUTPATIENT
Start: 2022-05-23 | End: 2022-05-23

## 2022-05-23 RX ORDER — IBUPROFEN 600 MG/1
TABLET ORAL
COMMUNITY
Start: 2022-03-11

## 2022-05-23 RX ORDER — GABAPENTIN 300 MG/1
CAPSULE ORAL
COMMUNITY
Start: 2022-05-05

## 2022-05-23 RX ORDER — TRIAMCINOLONE ACETONIDE 40 MG/ML
40 INJECTION, SUSPENSION INTRA-ARTICULAR; INTRAMUSCULAR ONCE
Status: DISCONTINUED | OUTPATIENT
Start: 2022-05-23 | End: 2022-05-23

## 2022-05-23 RX ORDER — HYDROCODONE BITARTRATE AND ACETAMINOPHEN 5; 325 MG/1; MG/1
TABLET ORAL
COMMUNITY
Start: 2022-03-11 | End: 2022-05-23

## 2022-05-23 RX ADMIN — TRIAMCINOLONE ACETONIDE 40 MG: 40 INJECTION, SUSPENSION INTRA-ARTICULAR; INTRAMUSCULAR at 11:08

## 2023-02-10 ENCOUNTER — OFFICE VISIT (OUTPATIENT)
Dept: PRIMARY CARE CLINIC | Age: 53
End: 2023-02-10
Payer: COMMERCIAL

## 2023-02-10 VITALS
HEIGHT: 65 IN | RESPIRATION RATE: 18 BRPM | BODY MASS INDEX: 42.12 KG/M2 | SYSTOLIC BLOOD PRESSURE: 114 MMHG | DIASTOLIC BLOOD PRESSURE: 71 MMHG | HEART RATE: 70 BPM | WEIGHT: 252.8 LBS | TEMPERATURE: 97.5 F | OXYGEN SATURATION: 98 %

## 2023-02-10 DIAGNOSIS — E55.9 VITAMIN D DEFICIENCY: ICD-10-CM

## 2023-02-10 DIAGNOSIS — R06.83 SNORES: ICD-10-CM

## 2023-02-10 DIAGNOSIS — E66.01 MORBIDLY OBESE (HCC): ICD-10-CM

## 2023-02-10 DIAGNOSIS — E78.2 MIXED HYPERLIPIDEMIA: ICD-10-CM

## 2023-02-10 DIAGNOSIS — E04.1 THYROID NODULE: ICD-10-CM

## 2023-02-10 DIAGNOSIS — Z12.11 COLON CANCER SCREENING: ICD-10-CM

## 2023-02-10 DIAGNOSIS — Z12.31 ENCOUNTER FOR SCREENING MAMMOGRAM FOR MALIGNANT NEOPLASM OF BREAST: ICD-10-CM

## 2023-02-10 DIAGNOSIS — Z00.00 PHYSICAL EXAM: Primary | ICD-10-CM

## 2023-02-10 DIAGNOSIS — Z90.710 S/P HYSTERECTOMY: ICD-10-CM

## 2023-02-10 DIAGNOSIS — K52.9 GASTROENTERITIS: ICD-10-CM

## 2023-02-10 NOTE — PROGRESS NOTES
1. \"Have you been to the ER, urgent care clinic since your last visit? Hospitalized since your last visit? \" No    2. \"Have you seen or consulted any other health care providers outside of the [address] [place] [address] since your last visit? \" No     3. For patients aged 39-70: Has the patient had a colonoscopy / FIT/ Cologuard? No  But wants to schedule  one    If the patient is female:    4. For patients aged 41-77: Has the patient had a mammogram within the past 2 years? Yes - no Care Gap present      5. For patients aged 21-65: Has the patient had a pap smear? NA - based on age or sex     . Tj Guadarrama Chief Complaint   Patient presents with    Physical     Stomach pain was throwing up/nausea Friday to Sunday, Friday ate fried chicken and red velvet cake. Feels bloated and feels like she can have a full bowel movement. Feels gassy. Is starting to slowly feel better.

## 2023-02-10 NOTE — PROGRESS NOTES
Subjective:   46 y.o. female for Well Woman Check. No LMP recorded. Patient has had a hysterectomy. Had a nausea , vomiting and loose bowel movements last Friday after eating out . Still has bloating but symptoms are improving. She does snores at night. Social History: single partner, contraception - none. Pertinent past medical hstory: no history of HTN, DVT, CAD, DM, liver disease, migraines or smoking. Patient Active Problem List   Diagnosis Code    Osteoarthritis of right knee M17.11    Thyroid nodule E04.1    S/P hysterectomy Z90.710     Current Outpatient Medications   Medication Sig Dispense Refill    ibuprofen (MOTRIN) 600 mg tablet TAKE 1 TABLET BY MOUTH THREE TIMES DAILY AFTER A MEAL      gabapentin (NEURONTIN) 300 mg capsule TAKE 1 CAPSULE AT HOUR OF SLEEP (Patient not taking: Reported on 2/10/2023)       No Known Allergies  Past Medical History:   Diagnosis Date    UTI (urinary tract infection)      Past Surgical History:   Procedure Laterality Date    HX BREAST REDUCTION Bilateral 1988    HX HYSTERECTOMY  1993    WA UNLISTED PROCEDURE BREAST  1998    breast reduction    WA UNLISTED PROCEDURE FOOT/TOES       Family History   Problem Relation Age of Onset    Cancer Maternal Aunt     Breast Cancer Maternal Aunt         M. Aunt diagnosed in her early 63's    Heart Disease Maternal Grandmother     Cancer Maternal Grandmother     Breast Cancer Maternal Grandmother         Age at diagnosis unknown      Social History     Tobacco Use    Smoking status: Never    Smokeless tobacco: Never   Substance Use Topics    Alcohol use: Yes        ROS:  Feeling well. No dyspnea or chest pain on exertion. No abdominal pain, change in bowel habits, black or bloody stools. No urinary tract symptoms. GYN ROS: no breast pain or new or enlarging lumps on self exam, no discharge or pelvic pain. No neurological complaints.     Objective:   Visit Vitals  /71 (BP 1 Location: Left upper arm, BP Patient Position: Sitting)   Pulse 70   Temp 97.5 °F (36.4 °C) (Temporal)   Resp 18   Ht 5' 5\" (1.651 m)   Wt 252 lb 12.8 oz (114.7 kg)   SpO2 98%   BMI 42.07 kg/m²     The patient appears well, alert, oriented x 3, in no distress. ENT normal.  Neck supple. No adenopathy , + Thyroid nodule  PAUL. Lungs are clear, good air entry, no wheezes, rhonchi or rales. S1 and S2 normal, no murmurs, regular rate and rhythm. Abdomen soft without tenderness, guarding, mass or organomegaly. Extremities show no edema, normal peripheral pulses. Neurological is normal, no focal findings. BREAST EXAM: breasts appear normal, no suspicious masses, no skin or nipple changes or axillary nodes        Assessment/Plan:   well woman  mammogram  return annually or prn    ICD-10-CM ICD-9-CM    1. Physical exam  Y07.45 L16.4 METABOLIC PANEL, COMPREHENSIVE      CBC W/O DIFF      LIPID PANEL      TSH 3RD GENERATION      Complete physical exam done. 2. Colon cancer screening  Z12.11 V76.51 REFERRAL TO GASTROENTEROLOGY      OCCULT BLOOD IMMUNOASSAY,DIAGNOSTIC      OCCULT BLOOD IMMUNOASSAY,DIAGNOSTIC      3. Gastroenteritis  K52.9 558.9 Symptoms are resolving. Stay away from dairy products for next few days. 4. Mixed hyperlipidemia  E78.2 272.2 Check Lipid panel. 5. Vitamin D deficiency  E55.9 268.9 VITAMIN D, 25 HYDROXY      VITAMIN D, 25 HYDROXY      6. Morbidly obese (HCC)  E66.01 278.01 Recommended 10,000 steps/day . Low carb diet. 7. Thyroid nodule  E04.1 241.0 US THYROID/PARATHYROID/SOFT TISS      8. Snores  R06.83 786.09 SLEEP MEDICINE REFERRAL      9. S/P hysterectomy  Z90.710 V88.01 No need for Pap smear. 10. Encounter for screening mammogram for malignant neoplasm of breast  Z12.31 V76.12 KENNETH 3D JASVIR W MAMMO BI SCREENING INCL CAD      .

## 2023-02-10 NOTE — PROGRESS NOTES
Douglas Trujillo (: 1970) is a 46 y.o. female, established patient, here for evaluation of the following chief complaint(s):  No chief complaint on file. ASSESSMENT/PLAN:  Below is the assessment and plan developed based on review of pertinent history, physical exam, labs, studies, and medications. {There are no diagnoses linked to this encounter. (Refresh or delete this SmartLink)}    No follow-ups on file. SUBJECTIVE/OBJECTIVE:  HPI  Patient presents today for an office visit and a physical.            Patient Active Problem List   Diagnosis Code    Osteoarthritis of right knee M17.11        Current Outpatient Medications on File Prior to Visit   Medication Sig Dispense Refill    gabapentin (NEURONTIN) 300 mg capsule TAKE 1 CAPSULE AT HOUR OF SLEEP      ibuprofen (MOTRIN) 600 mg tablet TAKE 1 TABLET BY MOUTH THREE TIMES DAILY AFTER A MEAL       No current facility-administered medications on file prior to visit. No Known Allergies    Past Medical History:   Diagnosis Date    UTI (urinary tract infection)        Past Surgical History:   Procedure Laterality Date    FOOT/TOES SURGERY PROC UNLISTED      HX BREAST REDUCTION Bilateral     HX HYSTERECTOMY      AK BREAST SURGERY PROCEDURE UNLISTED  1998    breast reduction       Family History   Problem Relation Age of Onset    Cancer Maternal Aunt     Breast Cancer Maternal Aunt         M.  Aunt diagnosed in her early 63's    Heart Disease Maternal Grandmother     Cancer Maternal Grandmother     Breast Cancer Maternal Grandmother         Age at diagnosis unknown        Social History     Socioeconomic History    Marital status: SINGLE     Spouse name: Not on file    Number of children: Not on file    Years of education: Not on file    Highest education level: Not on file   Occupational History    Not on file   Tobacco Use    Smoking status: Never    Smokeless tobacco: Never   Vaping Use    Vaping Use: Never used   Substance and Sexual Activity    Alcohol use: Yes    Drug use: Never    Sexual activity: Not on file   Other Topics Concern    Not on file   Social History Narrative    Not on file     Social Determinants of Health     Financial Resource Strain: Not on file   Food Insecurity: Not on file   Transportation Needs: Not on file   Physical Activity: Not on file   Stress: Not on file   Social Connections: Not on file   Intimate Partner Violence: Not on file   Housing Stability: Not on file       No visits with results within 3 Month(s) from this visit. Latest known visit with results is:   Office Visit on 02/22/2022   Component Date Value Ref Range Status    Glucose 02/22/2022 95  65 - 99 mg/dL Final    BUN 02/22/2022 11  6 - 24 mg/dL Final    Creatinine 02/22/2022 0.69  0.57 - 1.00 mg/dL Final    Comment:                **Effective February 28, 2022 Rosalina Bryan will begin**                   reporting the 2021 CKD-EPI creatinine equation that                   estimates kidney function without a race variable. GFR est non-AA 02/22/2022 101  >59 mL/min/1.73 Final    GFR est AA 02/22/2022 117  >59 mL/min/1.73 Final    Comment: **In accordance with recommendations from the NKF-ASN Task force,**    [facility] is in the process of updating its eGFR calculation to the    2021 CKD-EPI creatinine equation that estimates kidney function    without a race variable. BUN/Creatinine ratio 02/22/2022 16  9 - 23 Final    Sodium 02/22/2022 145 (A)  134 - 144 mmol/L Final    Potassium 02/22/2022 4.1  3.5 - 5.2 mmol/L Final    Chloride 02/22/2022 105  96 - 106 mmol/L Final    CO2 02/22/2022 25  20 - 29 mmol/L Final    Calcium 02/22/2022 10.4 (A)  8.7 - 10.2 mg/dL Final    Protein, total 02/22/2022 7.6  6.0 - 8.5 g/dL Final    Albumin 02/22/2022 4.6  3.8 - 4.9 g/dL Final    GLOBULIN, TOTAL 02/22/2022 3.0  1.5 - 4.5 g/dL Final    A-G Ratio 02/22/2022 1.5  1.2 - 2.2 Final    Bilirubin, total 02/22/2022 0.7  0.0 - 1.2 mg/dL Final    Alk.  phosphatase 02/22/2022 105  44 - 121 IU/L Final    AST (SGOT) 02/22/2022 19  0 - 40 IU/L Final    ALT (SGPT) 02/22/2022 18  0 - 32 IU/L Final    WBC 02/22/2022 6.5  3.4 - 10.8 x10E3/uL Final    RBC 02/22/2022 4.83  3.77 - 5.28 x10E6/uL Final    HGB 02/22/2022 12.3  11.1 - 15.9 g/dL Final    HCT 02/22/2022 38.1  34.0 - 46.6 % Final    MCV 02/22/2022 79  79 - 97 fL Final    MCH 02/22/2022 25.5 (A)  26.6 - 33.0 pg Final    MCHC 02/22/2022 32.3  31.5 - 35.7 g/dL Final    RDW 02/22/2022 13.5  11.7 - 15.4 % Final    PLATELET 13/49/5442 158  150 - 450 x10E3/uL Final       Review of Systems   Constitutional:  Negative for activity change, fatigue and unexpected weight change. HENT:  Negative for congestion, hearing loss, rhinorrhea and sore throat. Eyes:  Negative for discharge. Respiratory:  Negative for cough, chest tightness and shortness of breath. Cardiovascular:  Negative for leg swelling. Gastrointestinal:  Negative for abdominal pain, constipation and diarrhea. Genitourinary:  Negative for dysuria, flank pain, frequency and urgency. Musculoskeletal:  Negative for arthralgias, back pain and myalgias. Skin:  Negative for color change and rash. Neurological:  Negative for dizziness, light-headedness and headaches. Psychiatric/Behavioral:  Negative for dysphoric mood and sleep disturbance. The patient is not nervous/anxious. There were no vitals taken for this visit. Physical Exam  Vitals and nursing note reviewed. Constitutional:       General: She is not in acute distress. Appearance: Normal appearance. She is normal weight. She is not diaphoretic. HENT:      Right Ear: Tympanic membrane, ear canal and external ear normal.      Left Ear: Tympanic membrane, ear canal and external ear normal.      Mouth/Throat:      Mouth: Mucous membranes are moist.      Pharynx: Oropharynx is clear. Eyes:      General:         Right eye: No discharge. Left eye: No discharge.       Extraocular Movements: Extraocular movements intact. Conjunctiva/sclera: Conjunctivae normal.   Neck:      Thyroid: No thyromegaly. Cardiovascular:      Rate and Rhythm: Normal rate and regular rhythm. Pulses: Normal pulses. Dorsalis pedis pulses are 2+ on the right side and 2+ on the left side. Pulmonary:      Effort: Pulmonary effort is normal.      Breath sounds: Normal breath sounds. No wheezing. Abdominal:      General: Bowel sounds are normal. There is no distension. Palpations: Abdomen is soft. Tenderness: There is no abdominal tenderness. Musculoskeletal:      Right lower leg: No edema. Left lower leg: No edema. Comments: B/L knees without crepitus   Lymphadenopathy:      Cervical: No cervical adenopathy. Neurological:      Deep Tendon Reflexes:      Reflex Scores:       Patellar reflexes are 2+ on the right side and 2+ on the left side. Psychiatric:         Mood and Affect: Mood and affect normal.         ISolis MD, personally performed the services described in this documentation as scribed by Eugenie Raya in my presence, and it is both accurate and complete. An electronic signature was used to authenticate this note.   -- Eugenie Dorota

## 2023-02-24 ENCOUNTER — HOSPITAL ENCOUNTER (OUTPATIENT)
Dept: ULTRASOUND IMAGING | Age: 53
End: 2023-02-24
Attending: INTERNAL MEDICINE
Payer: COMMERCIAL

## 2023-02-24 ENCOUNTER — HOSPITAL ENCOUNTER (OUTPATIENT)
Dept: MAMMOGRAPHY | Age: 53
Discharge: HOME | End: 2023-02-24
Attending: INTERNAL MEDICINE
Payer: COMMERCIAL

## 2023-02-24 DIAGNOSIS — Z12.31 ENCOUNTER FOR SCREENING MAMMOGRAM FOR MALIGNANT NEOPLASM OF BREAST: ICD-10-CM

## 2023-02-24 DIAGNOSIS — E04.1 THYROID NODULE: ICD-10-CM

## 2023-02-24 PROCEDURE — 76536 US EXAM OF HEAD AND NECK: CPT

## 2023-02-24 PROCEDURE — 77063 BREAST TOMOSYNTHESIS BI: CPT

## 2023-02-26 ENCOUNTER — APPOINTMENT (OUTPATIENT)
Dept: CT IMAGING | Age: 53
End: 2023-02-26
Attending: EMERGENCY MEDICINE
Payer: COMMERCIAL

## 2023-02-26 ENCOUNTER — HOSPITAL ENCOUNTER (EMERGENCY)
Age: 53
Discharge: HOME | End: 2023-02-26
Attending: STUDENT IN AN ORGANIZED HEALTH CARE EDUCATION/TRAINING PROGRAM
Payer: COMMERCIAL

## 2023-02-26 VITALS
HEART RATE: 76 BPM | DIASTOLIC BLOOD PRESSURE: 89 MMHG | OXYGEN SATURATION: 100 % | HEIGHT: 65 IN | TEMPERATURE: 98.1 F | SYSTOLIC BLOOD PRESSURE: 150 MMHG | RESPIRATION RATE: 17 BRPM | BODY MASS INDEX: 42.82 KG/M2 | WEIGHT: 257 LBS

## 2023-02-26 DIAGNOSIS — R10.84 ABDOMINAL PAIN, GENERALIZED: Primary | ICD-10-CM

## 2023-02-26 DIAGNOSIS — K76.9 HEPATIC LESION: ICD-10-CM

## 2023-02-26 DIAGNOSIS — K45.8 HERNIA OF PELVIC FLOOR: ICD-10-CM

## 2023-02-26 LAB
ALBUMIN SERPL-MCNC: 4 G/DL (ref 3.5–5)
ALBUMIN/GLOB SERPL: 0.9 (ref 1.1–2.2)
ALP SERPL-CCNC: 87 U/L (ref 45–117)
ALT SERPL-CCNC: 24 U/L (ref 12–78)
ANION GAP SERPL CALC-SCNC: 2 MMOL/L (ref 5–15)
APPEARANCE UR: CLEAR
AST SERPL-CCNC: 44 U/L (ref 15–37)
BACTERIA URNS QL MICRO: ABNORMAL /HPF
BASOPHILS # BLD: 0.1 K/UL (ref 0–0.1)
BASOPHILS NFR BLD: 1 % (ref 0–1)
BILIRUB SERPL-MCNC: 1.1 MG/DL (ref 0.2–1)
BILIRUB UR QL: NEGATIVE
BUN SERPL-MCNC: 11 MG/DL (ref 6–20)
BUN/CREAT SERPL: 15 (ref 12–20)
CALCIUM SERPL-MCNC: 10.5 MG/DL (ref 8.5–10.1)
CHLORIDE SERPL-SCNC: 107 MMOL/L (ref 97–108)
CO2 SERPL-SCNC: 30 MMOL/L (ref 21–32)
COLOR UR: ABNORMAL
CREAT SERPL-MCNC: 0.74 MG/DL (ref 0.55–1.02)
DIFFERENTIAL METHOD BLD: ABNORMAL
EOSINOPHIL # BLD: 0.2 K/UL (ref 0–0.4)
EOSINOPHIL NFR BLD: 3 % (ref 0–7)
EPITH CASTS URNS QL MICRO: ABNORMAL /LPF
ERYTHROCYTE [DISTWIDTH] IN BLOOD BY AUTOMATED COUNT: 14 % (ref 11.5–14.5)
GLOBULIN SER CALC-MCNC: 4.6 G/DL (ref 2–4)
GLUCOSE SERPL-MCNC: 95 MG/DL (ref 65–100)
GLUCOSE UR STRIP.AUTO-MCNC: NEGATIVE MG/DL
HCT VFR BLD AUTO: 41 % (ref 35–47)
HGB BLD-MCNC: 13.1 G/DL (ref 11.5–16)
HGB UR QL STRIP: NEGATIVE
HYALINE CASTS URNS QL MICRO: ABNORMAL /LPF (ref 0–5)
IMM GRANULOCYTES # BLD AUTO: 0 K/UL (ref 0–0.04)
IMM GRANULOCYTES NFR BLD AUTO: 0 % (ref 0–0.5)
KETONES UR QL STRIP.AUTO: NEGATIVE MG/DL
LEUKOCYTE ESTERASE UR QL STRIP.AUTO: NEGATIVE
LIPASE SERPL-CCNC: 42 U/L (ref 73–393)
LYMPHOCYTES # BLD: 1.9 K/UL (ref 0.8–3.5)
LYMPHOCYTES NFR BLD: 35 % (ref 12–49)
MCH RBC QN AUTO: 25 PG (ref 26–34)
MCHC RBC AUTO-ENTMCNC: 32 G/DL (ref 30–36.5)
MCV RBC AUTO: 78.1 FL (ref 80–99)
MONOCYTES # BLD: 0.4 K/UL (ref 0–1)
MONOCYTES NFR BLD: 8 % (ref 5–13)
NEUTS SEG # BLD: 2.8 K/UL (ref 1.8–8)
NEUTS SEG NFR BLD: 53 % (ref 32–75)
NITRITE UR QL STRIP.AUTO: NEGATIVE
NRBC # BLD: 0 K/UL (ref 0–0.01)
NRBC BLD-RTO: 0 PER 100 WBC
PH UR STRIP: 7 (ref 5–8)
PLATELET # BLD AUTO: 216 K/UL (ref 150–400)
PMV BLD AUTO: 12.7 FL (ref 8.9–12.9)
POTASSIUM SERPL-SCNC: 5 MMOL/L (ref 3.5–5.1)
PROT SERPL-MCNC: 8.6 G/DL (ref 6.4–8.2)
PROT UR STRIP-MCNC: NEGATIVE MG/DL
RBC # BLD AUTO: 5.25 M/UL (ref 3.8–5.2)
RBC #/AREA URNS HPF: ABNORMAL /HPF (ref 0–5)
SODIUM SERPL-SCNC: 139 MMOL/L (ref 136–145)
SP GR UR REFRACTOMETRY: 1.01 (ref 1–1.03)
UR CULT HOLD, URHOLD: NORMAL
UROBILINOGEN UR QL STRIP.AUTO: 1 EU/DL (ref 0.2–1)
WBC # BLD AUTO: 5.2 K/UL (ref 3.6–11)
WBC URNS QL MICRO: ABNORMAL /HPF (ref 0–4)

## 2023-02-26 PROCEDURE — 81001 URINALYSIS AUTO W/SCOPE: CPT

## 2023-02-26 PROCEDURE — 96361 HYDRATE IV INFUSION ADD-ON: CPT

## 2023-02-26 PROCEDURE — 96374 THER/PROPH/DIAG INJ IV PUSH: CPT

## 2023-02-26 PROCEDURE — 74011000250 HC RX REV CODE- 250: Performed by: EMERGENCY MEDICINE

## 2023-02-26 PROCEDURE — 99285 EMERGENCY DEPT VISIT HI MDM: CPT

## 2023-02-26 PROCEDURE — 74011250637 HC RX REV CODE- 250/637: Performed by: EMERGENCY MEDICINE

## 2023-02-26 PROCEDURE — 74177 CT ABD & PELVIS W/CONTRAST: CPT

## 2023-02-26 PROCEDURE — 36415 COLL VENOUS BLD VENIPUNCTURE: CPT

## 2023-02-26 PROCEDURE — 85025 COMPLETE CBC W/AUTO DIFF WBC: CPT

## 2023-02-26 PROCEDURE — 74011000636 HC RX REV CODE- 636: Performed by: RADIOLOGY

## 2023-02-26 PROCEDURE — 74011250636 HC RX REV CODE- 250/636: Performed by: EMERGENCY MEDICINE

## 2023-02-26 PROCEDURE — 96375 TX/PRO/DX INJ NEW DRUG ADDON: CPT

## 2023-02-26 PROCEDURE — 83690 ASSAY OF LIPASE: CPT

## 2023-02-26 PROCEDURE — 80053 COMPREHEN METABOLIC PANEL: CPT

## 2023-02-26 RX ORDER — ONDANSETRON 4 MG/1
4 TABLET, FILM COATED ORAL
Qty: 20 TABLET | Refills: 0 | Status: SHIPPED | OUTPATIENT
Start: 2023-02-26

## 2023-02-26 RX ORDER — ADHESIVE BANDAGE
30 BANDAGE TOPICAL
Status: COMPLETED | OUTPATIENT
Start: 2023-02-26 | End: 2023-02-26

## 2023-02-26 RX ORDER — ONDANSETRON 2 MG/ML
4 INJECTION INTRAMUSCULAR; INTRAVENOUS
Status: COMPLETED | OUTPATIENT
Start: 2023-02-26 | End: 2023-02-26

## 2023-02-26 RX ADMIN — FAMOTIDINE 20 MG: 10 INJECTION INTRAVENOUS at 09:23

## 2023-02-26 RX ADMIN — MAGNESIUM HYDROXIDE 30 ML: 400 SUSPENSION ORAL at 10:35

## 2023-02-26 RX ADMIN — IOPAMIDOL 100 ML: 755 INJECTION, SOLUTION INTRAVENOUS at 09:18

## 2023-02-26 RX ADMIN — ONDANSETRON HYDROCHLORIDE 4 MG: 2 SOLUTION INTRAMUSCULAR; INTRAVENOUS at 09:20

## 2023-02-26 RX ADMIN — SODIUM CHLORIDE 1000 ML: 9 INJECTION, SOLUTION INTRAVENOUS at 09:21

## 2023-02-26 NOTE — DISCHARGE INSTRUCTIONS
Increase fiber in your diet through foods such as oatmeal, fiber 1 cereals, fiber 1 bar, fruits and vegetables. May take fiber supplement such as sugar-free Metamucil 2 tablespoons, fiber choice tablets or benefiber. Take Miralax daily as prescribed. !/2 cup pear juice is as effected as 1/2 cup prune juice. May take 1 to 2 tablets of ACAI Albright tablets daily to maintain regular bowel movements. Please have close follow up appointment.

## 2023-02-26 NOTE — Clinical Note
[address]  [address] [facility] [address]  [phone]    Work/School Note    Date: 2/26/2023    To Whom It May concern:    Mitali Flor was seen and treated today in the emergency room by the following provider(s):  Attending Provider: Tate Saab MD  Nurse Practitioner: Era Ball NP. Mitali Flor is excused from work/school on 2/26/2023 through 2/28/2023. She is medically clear to return to work/school on 3/1/2023.          Sincerely,          Phoenix Dwyer NP

## 2023-02-26 NOTE — Clinical Note
[address]  [address] [facility] [address]  [phone]    Work/School Note    Date: 2/26/2023    To Whom It May concern:    Davina Heimlich was seen and treated today in the emergency room by the following provider(s):  Attending Provider: Miguel Angel Henriquez MD  Nurse Practitioner: Luzma Gray NP. Davina Heimlich is excused from work/school on 2/26/2023 through 2/28/2023. She is medically clear to return to work/school on 3/1/2023.          Sincerely,          Shira Brody, NP

## 2023-02-26 NOTE — ED TRIAGE NOTES
Pt arrives ambulatory c/o \"stomach problem\" x 2 weeks. Pt is constipated, tried Fleet enema, suppository, Miralax, Metamucil with no relief. Pt also c/o chest pain and right arm pain from \"feeling full\". Pt endorses vomiting yesterday with poor appetite. Pt has no other complaints and no PMH of significance. Pt is AOx4 in no apparent distress. DREAD Hooks at bedside.

## 2023-02-26 NOTE — ED PROVIDER NOTES
Abdominal Pain   Associated symptoms include nausea, vomiting and constipation. Pertinent negatives include no fever, no dysuria, no headaches, no chest pain and no back pain. Patient is a 54-year-old female with past medical history significant for hysterectomy, and UTI who presents to the ED with abdominal pressure/fullness for several days accompanied by episodic nonbloody vomiting and constipation. She has taken Dulcolax suppositories, fleets enema and MiraLAX with minimal results. She has not had any solid food since yesterday morning. She states anything that she eats makes her nauseous even drinking water. Denies fever, cold symptoms, headache, neck pain, visual changes, focal weakness or rash. Denies any difficulty breathing, difficulty swallowing, SOB or chest pain. She drove herself here. Past Medical History:   Diagnosis Date    UTI (urinary tract infection)        Past Surgical History:   Procedure Laterality Date    HX BREAST REDUCTION Bilateral 1988    HX HYSTERECTOMY  1993    [facility] UNLISTED PROCEDURE BREAST  1998    breast reduction    VA UNLISTED PROCEDURE FOOT/TOES           Family History:   Problem Relation Age of Onset    Cancer Maternal Aunt     Breast Cancer Maternal Aunt         M.  Aunt diagnosed in her early 63's    Heart Disease Maternal Grandmother     Cancer Maternal Grandmother     Breast Cancer Maternal Grandmother         Age at diagnosis unknown        Social History     Socioeconomic History    Marital status: SINGLE     Spouse name: Not on file    Number of children: Not on file    Years of education: Not on file    Highest education level: Not on file   Occupational History    Not on file   Tobacco Use    Smoking status: Never    Smokeless tobacco: Never   Vaping Use    Vaping Use: Never used   Substance and Sexual Activity    Alcohol use: Yes    Drug use: Never    Sexual activity: Not on file   Other Topics Concern    Not on file   Social History Narrative    Not on file Social Determinants of Health     Financial Resource Strain: Not on file   Food Insecurity: Not on file   Transportation Needs: Not on file   Physical Activity: Not on file   Stress: Not on file   Social Connections: Not on file   Intimate Partner Violence: Not on file   Housing Stability: Not on file         ALLERGIES: Patient has no known allergies. Review of Systems   Constitutional:  Positive for appetite change. Negative for activity change, fever and unexpected weight change. HENT:  Negative for congestion, rhinorrhea, sore throat and trouble swallowing. Eyes:  Negative for visual disturbance. Respiratory:  Negative for cough and shortness of breath. Cardiovascular:  Negative for chest pain, palpitations and leg swelling. Gastrointestinal:  Positive for abdominal pain, constipation, nausea and vomiting. Genitourinary:  Negative for dysuria and flank pain. Musculoskeletal:  Negative for back pain and neck pain. Skin:  Negative for rash. Neurological:  Negative for headaches. All other systems reviewed and are negative. Vitals:    02/26/23 0835   BP: (!) 150/89   Pulse: 76   Resp: 17   Temp: 98.1 °F (36.7 °C)   SpO2: 100%   Weight: 116.6 kg (257 lb)   Height: 5' 5\" (1.651 m)            Physical Exam  Vitals and nursing note reviewed. Constitutional:       General: She is not in acute distress. Appearance: She is well-developed. She is not ill-appearing, toxic-appearing or diaphoretic. Comments: Black female; non smoker; works at "[employer]:      Head: Normocephalic. Cardiovascular:      Rate and Rhythm: Normal rate and regular rhythm. Pulmonary:      Effort: Pulmonary effort is normal.      Breath sounds: Normal breath sounds. Abdominal:      General: Bowel sounds are decreased. There is no distension. There are no signs of injury. Palpations: Abdomen is soft. Tenderness:  There is abdominal tenderness in the epigastric area, periumbilical area and left lower quadrant. There is no guarding or rebound. Negative signs include King's sign and McBurney's sign. Hernia: No hernia is present. Skin:     General: Skin is warm and dry. Findings: No erythema or rash. Neurological:      General: No focal deficit present. Mental Status: She is alert and oriented to person, place, and time. Psychiatric:         Behavior: Behavior normal.        Medical Decision Making  Amount and/or Complexity of Data Reviewed  Labs: ordered. Radiology: ordered. Risk  OTC drugs. Prescription drug management. Procedures      Labs Reviewed   CBC WITH AUTOMATED DIFF - Abnormal; Notable for the following components:       Result Value    RBC 5.25 (*)     MCV 78.1 (*)     MCH 25.0 (*)     All other components within normal limits   METABOLIC PANEL, COMPREHENSIVE - Abnormal; Notable for the following components:    Anion gap 2 (*)     Calcium 10.5 (*)     Bilirubin, total 1.1 (*)     AST (SGOT) 44 (*)     Protein, total 8.6 (*)     Globulin 4.6 (*)     A-G Ratio 0.9 (*)     All other components within normal limits   LIPASE - Abnormal; Notable for the following components:    Lipase 42 (*)     All other components within normal limits   URINALYSIS W/MICROSCOPIC - Abnormal; Notable for the following components:    Bacteria 1+ (*)     All other components within normal limits   URINE CULTURE HOLD SAMPLE     CT ABD PELV W CONT    Result Date: 2/26/2023  1. No acute process on CT. No bowel obstruction. 2. 2.3 cm segment 4 hepatic lesion cannot be fully characterized on this single phase study. 3. Midline pelvic wall ventral hernia. Recommendation: Nonemergent MRI abdomen as an outpatient. Patient has been reexamined and is tolerating p.o. fluids well. Recommend close follow-up with general surgery and gastroenterology for further evaluation and treatment.   Encouraged her to keep a food and fluid diary and to do small portions more frequently than too much at once. Work note provided. Discussed plan of care with Dr. Sarwat Chapin. Mac Patel NP      5:58 PM  Patient's results and plan of care have been reviewed with her. Patient has verbally conveyed her understanding and agreement of her signs, symptoms, diagnosis, treatment and prognosis and additionally agrees to follow up as recommended or return to the Emergency Room should her condition change prior to follow-up. Discharge instructions have also been provided to the patient with some educational information regarding her diagnosis as well a list of reasons why she would want to return to the ER prior to her follow-up appointment should her condition change. Mac Patel NP

## 2023-02-26 NOTE — Clinical Note
[address]  [address] [facility] [address]  [phone]    Work/School Note    Date: 2/26/2023    To Whom It May concern:    Evie Muro was seen and treated today in the emergency room by the following provider(s):  Attending Provider: Zahira Tristan MD  Nurse Practitioner: Everette Hernandez NP. Evie Muro is excused from work/school on 2/26/2023 through 2/28/2023. She is medically clear to return to work/school on 3/1/2023.          Sincerely,          Alexandru Whitney NP PT REQUESTING RX CHANGED TO [facility]. RX CALLED IN TO [facility] PER PT REQUEST.

## 2023-02-27 ENCOUNTER — OFFICE VISIT (OUTPATIENT)
Dept: PRIMARY CARE CLINIC | Age: 53
End: 2023-02-27
Payer: COMMERCIAL

## 2023-02-27 VITALS
TEMPERATURE: 97.5 F | SYSTOLIC BLOOD PRESSURE: 106 MMHG | HEART RATE: 56 BPM | BODY MASS INDEX: 41.79 KG/M2 | HEIGHT: 65 IN | RESPIRATION RATE: 16 BRPM | DIASTOLIC BLOOD PRESSURE: 68 MMHG | OXYGEN SATURATION: 98 % | WEIGHT: 250.8 LBS

## 2023-02-27 DIAGNOSIS — E04.1 THYROID NODULE: ICD-10-CM

## 2023-02-27 DIAGNOSIS — K76.9 HEPATIC LESION: ICD-10-CM

## 2023-02-27 DIAGNOSIS — R14.0 BLOATED ABDOMEN: ICD-10-CM

## 2023-02-27 DIAGNOSIS — K43.9 VENTRAL HERNIA WITHOUT OBSTRUCTION OR GANGRENE: Primary | ICD-10-CM

## 2023-02-27 PROCEDURE — 99214 OFFICE O/P EST MOD 30 MIN: CPT | Performed by: INTERNAL MEDICINE

## 2023-02-27 NOTE — PROGRESS NOTES
Shawn Hernandez (: 1970) is a 46 y.o. female, established patient, here for evaluation of the following chief complaint(s):  ED Follow-up (Was still having bloating and not a regular BM. Has tired many med to help with BM. Saturday chest felt tight and that she as full up to her chest. Right arm was tingly Saturday night and went to ER . They did a CT scan and said she had a hernia on pelvic floor and to follow up with GI. Still feels full but not as bad. )         ASSESSMENT/PLAN:  Below is the assessment and plan developed based on review of pertinent history, physical exam, labs, studies, and medications. 1. Ventral hernia without obstruction or gangrene  CT scan reviewed with her.  -     REFERRAL TO GENERAL SURGERY  Message sent to Dr. Tatiana Dietz. I referred her to general surgery. 2. Bloated abdomen  I recommend that she continue taking stool softeners which should relieve her bloated abdomen. 3. Hepatic lesion  I communicated with the general surgeon. I will order a liver MRI if necessary. 4. Thyroid nodule  US report reviewed with her.  -     REFERRAL TO GENERAL SURGERY  I referred her to general surgery. SUBJECTIVE/OBJECTIVE:  The patient presents today for an ED follow up for symptoms of irregular bowel movement and bloating. She went to the ED yesterday and she had a CT scan performed. She has a midline hernia on her pelvic floor. A lesion was also found on the right hepatic lobe on her liver. She denies constipation but she is feeling bloated. She had taken oral contraceptive pills 30 years ago. She has a thyroid nodule which has grown in size according to the thyroid ultrasound done on 23.       Patient Active Problem List   Diagnosis Code    Osteoarthritis of right knee M17.11    Thyroid nodule E04.1    S/P hysterectomy Z90.710        Current Outpatient Medications on File Prior to Visit   Medication Sig Dispense Refill    ibuprofen (MOTRIN) 600 mg tablet TAKE 1 TABLET BY MOUTH THREE TIMES DAILY AFTER A MEAL      ondansetron hcl (Zofran) 4 mg tablet Take 1 Tablet by mouth every eight (8) hours as needed for Nausea or Vomiting. (Patient not taking: Reported on 2/27/2023) 20 Tablet 0    gabapentin (NEURONTIN) 300 mg capsule TAKE 1 CAPSULE AT HOUR OF SLEEP (Patient not taking: No sig reported)       No current facility-administered medications on file prior to visit. No Known Allergies    Past Medical History:   Diagnosis Date    UTI (urinary tract infection)        Past Surgical History:   Procedure Laterality Date    HX BREAST REDUCTION Bilateral 1988    HX HYSTERECTOMY  1993    AL UNLISTED PROCEDURE BREAST  1998    breast reduction    AL UNLISTED PROCEDURE FOOT/TOES         Family History   Problem Relation Age of Onset    Cancer Maternal Aunt     Breast Cancer Maternal Aunt         M.  Aunt diagnosed in her early 63's    Heart Disease Maternal Grandmother     Cancer Maternal Grandmother     Breast Cancer Maternal Grandmother         Age at diagnosis unknown        Social History     Socioeconomic History    Marital status: SINGLE     Spouse name: Not on file    Number of children: Not on file    Years of education: Not on file    Highest education level: Not on file   Occupational History    Not on file   Tobacco Use    Smoking status: Never    Smokeless tobacco: Never   Vaping Use    Vaping Use: Never used   Substance and Sexual Activity    Alcohol use: Yes    Drug use: Never    Sexual activity: Not on file   Other Topics Concern    Not on file   Social History Narrative    Not on file     Social Determinants of Health     Financial Resource Strain: Low Risk     Difficulty of Paying Living Expenses: Not very hard   Food Insecurity: No Food Insecurity    Worried About Running Out of Food in the Last Year: Never true    Ran Out of Food in the Last Year: Never true   Transportation Needs: Not on file   Physical Activity: Not on file   Stress: Not on file   Social Connections: Not on file   Intimate Partner Violence: Not on file   Housing Stability: Not on file       Admission on 02/26/2023, Discharged on 02/26/2023   Component Date Value Ref Range Status    WBC 02/26/2023 5.2  3.6 - 11.0 K/uL Final    RBC 02/26/2023 5.25 (A)  3.80 - 5.20 M/uL Final    HGB 02/26/2023 13.1  11.5 - 16.0 g/dL Final    HCT 02/26/2023 41.0  35.0 - 47.0 % Final    MCV 02/26/2023 78.1 (A)  80.0 - 99.0 FL Final    MCH 02/26/2023 25.0 (A)  26.0 - 34.0 PG Final    MCHC 02/26/2023 32.0  30.0 - 36.5 g/dL Final    RDW 02/26/2023 14.0  11.5 - 14.5 % Final    PLATELET 21/99/2774 561  150 - 400 K/uL Final    MPV 02/26/2023 12.7  8.9 - 12.9 FL Final    NRBC 02/26/2023 0.0  0  WBC Final    ABSOLUTE NRBC 02/26/2023 0.00  0.00 - 0.01 K/uL Final    NEUTROPHILS 02/26/2023 53  32 - 75 % Final    LYMPHOCYTES 02/26/2023 35  12 - 49 % Final    MONOCYTES 02/26/2023 8  5 - 13 % Final    EOSINOPHILS 02/26/2023 3  0 - 7 % Final    BASOPHILS 02/26/2023 1  0 - 1 % Final    IMMATURE GRANULOCYTES 02/26/2023 0  0.0 - 0.5 % Final    ABS. NEUTROPHILS 02/26/2023 2.8  1.8 - 8.0 K/UL Final    ABS. LYMPHOCYTES 02/26/2023 1.9  0.8 - 3.5 K/UL Final    ABS. MONOCYTES 02/26/2023 0.4  0.0 - 1.0 K/UL Final    ABS. EOSINOPHILS 02/26/2023 0.2  0.0 - 0.4 K/UL Final    ABS. BASOPHILS 02/26/2023 0.1  0.0 - 0.1 K/UL Final    ABS. IMM.  GRANS. 02/26/2023 0.0  0.00 - 0.04 K/UL Final    DF 02/26/2023 AUTOMATED    Final    Sodium 02/26/2023 139  136 - 145 mmol/L Final    Potassium 02/26/2023 5.0  3.5 - 5.1 mmol/L Final    SPECIMEN HEMOLYZED, RESULTS MAY BE AFFECTED    Chloride 02/26/2023 107  97 - 108 mmol/L Final    CO2 02/26/2023 30  21 - 32 mmol/L Final    Anion gap 02/26/2023 2 (A)  5 - 15 mmol/L Final    Glucose 02/26/2023 95  65 - 100 mg/dL Final    BUN 02/26/2023 11  6 - 20 MG/DL Final    Creatinine 02/26/2023 0.74  0.55 - 1.02 MG/DL Final    BUN/Creatinine ratio 02/26/2023 15  12 - 20   Final    eGFR 02/26/2023 >60  >60 ml/min/1.73m2 Final    Comment:      Pediatric calculator link: Kylah.at. org/professionals/kdoqi/gfr_calculatorped       These results are not intended for use in patients <25years of age. eGFR results are calculated without a race factor using  the 2021 CKD-EPI equation. Careful clinical correlation is recommended, particularly when comparing to results calculated using previous equations. The CKD-EPI equation is less accurate in patients with extremes of muscle mass, extra-renal metabolism of creatinine, excessive creatine ingestion, or following therapy that affects renal tubular secretion. Calcium 02/26/2023 10.5 (A)  8.5 - 10.1 MG/DL Final    Bilirubin, total 02/26/2023 1.1 (A)  0.2 - 1.0 MG/DL Final    ALT (SGPT) 02/26/2023 24  12 - 78 U/L Final    AST (SGOT) 02/26/2023 44 (A)  15 - 37 U/L Final    SPECIMEN HEMOLYZED, RESULTS MAY BE AFFECTED    Alk.  phosphatase 02/26/2023 87  45 - 117 U/L Final    Protein, total 02/26/2023 8.6 (A)  6.4 - 8.2 g/dL Final    Albumin 02/26/2023 4.0  3.5 - 5.0 g/dL Final    Globulin 02/26/2023 4.6 (A)  2.0 - 4.0 g/dL Final    A-G Ratio 02/26/2023 0.9 (A)  1.1 - 2.2   Final    Lipase 02/26/2023 42 (A)  73 - 393 U/L Final    Color 02/26/2023 YELLOW/STRAW    Final    Color Reference Range: Straw, Yellow or Dark Yellow    Appearance 02/26/2023 CLEAR  CLEAR   Final    Specific gravity 02/26/2023 1.014  1.003 - 1.030   Final    pH (UA) 02/26/2023 7.0  5.0 - 8.0   Final    Protein 02/26/2023 Negative  NEG mg/dL Final    Glucose 02/26/2023 Negative  NEG mg/dL Final    Ketone 02/26/2023 Negative  NEG mg/dL Final    Bilirubin 02/26/2023 Negative  NEG   Final    Blood 02/26/2023 Negative  NEG   Final    Urobilinogen 02/26/2023 1.0  0.2 - 1.0 EU/dL Final    Nitrites 02/26/2023 Negative  NEG   Final    Leukocyte Esterase 02/26/2023 Negative  NEG   Final    WBC 02/26/2023 0-4  0 - 4 /hpf Final    RBC 02/26/2023 0-5  0 - 5 /hpf Final    Epithelial cells 02/26/2023 FEW  FEW /lpf Final Epithelial cell category consists of squamous cells and /or transitional urothelial cells. Renal tubular cells, if present, are separately identified as such. Bacteria 02/26/2023 1+ (A)  NEG /hpf Final    Hyaline cast 02/26/2023 0-2  0 - 5 /lpf Final    Urine culture hold 02/26/2023 Urine on hold in Microbiology dept for 2 days. If unpreserved urine is submitted, it cannot be used for addtional testing after 24 hours, recollection will be required. Final   Office Visit on 02/10/2023   Component Date Value Ref Range Status    Occult blood fecal, by IA 02/23/2023 Negative  Negative Final    Glucose 02/24/2023 93  70 - 99 mg/dL Final    BUN 02/24/2023 12  6 - 24 mg/dL Final    Creatinine 02/24/2023 0.73  0.57 - 1.00 mg/dL Final    eGFR 02/24/2023 99  >59 mL/min/1.73 Final    BUN/Creatinine ratio 02/24/2023 16  9 - 23 Final    Sodium 02/24/2023 143  134 - 144 mmol/L Final    Potassium 02/24/2023 3.9  3.5 - 5.2 mmol/L Final    Chloride 02/24/2023 105  96 - 106 mmol/L Final    CO2 02/24/2023 25  20 - 29 mmol/L Final    Calcium 02/24/2023 10.3 (A)  8.7 - 10.2 mg/dL Final    Protein, total 02/24/2023 7.8  6.0 - 8.5 g/dL Final    Albumin 02/24/2023 4.8  3.8 - 4.9 g/dL Final    GLOBULIN, TOTAL 02/24/2023 3.0  1.5 - 4.5 g/dL Final    A-G Ratio 02/24/2023 1.6  1.2 - 2.2 Final    Bilirubin, total 02/24/2023 0.9  0.0 - 1.2 mg/dL Final    Alk.  phosphatase 02/24/2023 91  44 - 121 IU/L Final    AST (SGOT) 02/24/2023 16  0 - 40 IU/L Final    ALT (SGPT) 02/24/2023 14  0 - 32 IU/L Final    WBC 02/24/2023 4.4  3.4 - 10.8 x10E3/uL Final    RBC 02/24/2023 4.79  3.77 - 5.28 x10E6/uL Final    HGB 02/24/2023 12.3  11.1 - 15.9 g/dL Final    HCT 02/24/2023 37.6  34.0 - 46.6 % Final    MCV 02/24/2023 79  79 - 97 fL Final    MCH 02/24/2023 25.7 (A)  26.6 - 33.0 pg Final    MCHC 02/24/2023 32.7  31.5 - 35.7 g/dL Final    RDW 02/24/2023 13.5  11.7 - 15.4 % Final    PLATELET 33/80/8833 290  150 - 450 x10E3/uL Final    Cholesterol, total 02/24/2023 216 (A)  100 - 199 mg/dL Final    Triglyceride 02/24/2023 82  0 - 149 mg/dL Final    HDL Cholesterol 02/24/2023 42  >39 mg/dL Final    VLDL, calculated 02/24/2023 15  5 - 40 mg/dL Final    LDL, calculated 02/24/2023 159 (A)  0 - 99 mg/dL Final    TSH 02/24/2023 0.922  0.450 - 4.500 uIU/mL Final    VITAMIN D, 25-HYDROXY 02/24/2023 10.1 (A)  30.0 - 100.0 ng/mL Final    Comment: Vitamin D deficiency has been defined by the [address] practice guideline as a  level of serum 25-OH vitamin D less than 20 ng/mL (1,2). The Endocrine Society went on to further define vitamin D  insufficiency as a level between 21 and 29 ng/mL (2). 1. IOM ([place] of Medicine). 2010. Dietary reference     intakes for calcium and D. [facility]: The     [employer]. 2. Chris MF, Kylah CASTAÑEDA, Marlena CARTER, et al.     Evaluation, treatment, and prevention of vitamin D     deficiency: an Endocrine Society clinical practice     guideline. JCEM. 2011 Jul; 96(7):1911-30. Review of Systems   Constitutional:  Negative for activity change, fatigue and unexpected weight change. HENT:  Negative for congestion, hearing loss, rhinorrhea and sore throat. Eyes:  Negative for discharge. Respiratory:  Negative for cough, chest tightness and shortness of breath. Cardiovascular:  Negative for leg swelling. Gastrointestinal:  Negative for abdominal pain, constipation and diarrhea. Abdominal bloating   Genitourinary:  Negative for dysuria, flank pain, frequency and urgency. Musculoskeletal:  Negative for arthralgias, back pain and myalgias. Skin:  Negative for color change and rash. Neurological:  Negative for dizziness, light-headedness and headaches. Psychiatric/Behavioral:  Negative for dysphoric mood and sleep disturbance. The patient is not nervous/anxious.     Visit Vitals  /68 (BP 1 Location: Left upper arm, BP Patient Position: Sitting)   Pulse (!) 56   Temp 97.5 °F (36.4 °C) (Temporal)   Resp 16   Ht 5' 5\" (1.651 m)   Wt 250 lb 12.8 oz (113.8 kg)   SpO2 98%   BMI 41.74 kg/m²       Physical Exam  Vitals and nursing note reviewed. Constitutional:       General: She is not in acute distress. Appearance: She is morbidly obese. She is not diaphoretic. HENT:      Right Ear: External ear normal.      Left Ear: External ear normal.   Eyes:      General: No scleral icterus. Right eye: No discharge. Left eye: No discharge. Extraocular Movements: Extraocular movements intact. Conjunctiva/sclera: Conjunctivae normal.   Cardiovascular:      Rate and Rhythm: Normal rate and regular rhythm. Pulmonary:      Effort: Pulmonary effort is normal.      Breath sounds: Normal breath sounds. No wheezing. Abdominal:      General: Bowel sounds are normal.      Palpations: Abdomen is soft. Tenderness: There is no abdominal tenderness. Musculoskeletal:      Cervical back: Normal range of motion and neck supple. Lymphadenopathy:      Cervical: No cervical adenopathy. Neurological:      Mental Status: She is alert and oriented to person, place, and time. Psychiatric:         Mood and Affect: Mood and affect normal.         I, Emma Olivares MD, personally performed the services described in this documentation as scribed by Lesley Reagan in my presence, and it is both accurate and complete. An electronic signature was used to authenticate this note.   -- Lesley Reagan

## 2023-02-27 NOTE — PROGRESS NOTES
1. \"Have you been to the ER, urgent care clinic since your last visit? Hospitalized since your last visit? \" Yes When: ER [facility]'s    2. \"Have you seen or consulted any other health care providers outside of the [address] [place] [address] since your last visit? \" No     3. For patients aged 39-70: Has the patient had a colonoscopy / FIT/ Cologuard? Yes - no Care Gap present      If the patient is female:    4. For patients aged 41-77: Has the patient had a mammogram within the past 2 years? Yes - no Care Gap present      5. For patients aged 21-65: Has the patient had a pap smear? NA - based on age or sex     Chief Complaint   Patient presents with    ED Follow-up     Was still having bloating and not a regular BM. Has tired many med to help with BM. Saturday chest felt tight and that she as full up to her chest. Right arm was tingly Saturday night and went to ER Sunday. They did a CT scan and said she had a hernia on pelvic floor and to follow up with GI. Still feels full but not as bad.

## 2023-03-03 ENCOUNTER — OFFICE VISIT (OUTPATIENT)
Dept: SURGERY | Age: 53
End: 2023-03-03
Payer: COMMERCIAL

## 2023-03-03 VITALS
HEART RATE: 60 BPM | WEIGHT: 246.6 LBS | RESPIRATION RATE: 18 BRPM | BODY MASS INDEX: 41.09 KG/M2 | HEIGHT: 65 IN | OXYGEN SATURATION: 98 % | TEMPERATURE: 98.1 F | SYSTOLIC BLOOD PRESSURE: 120 MMHG | DIASTOLIC BLOOD PRESSURE: 77 MMHG

## 2023-03-03 DIAGNOSIS — E04.2 NON-TOXIC MULTINODULAR GOITER: Primary | ICD-10-CM

## 2023-03-03 DIAGNOSIS — E04.2 GOITER, NONTOXIC, MULTINODULAR: ICD-10-CM

## 2023-03-03 DIAGNOSIS — K43.9 VENTRAL HERNIA WITHOUT OBSTRUCTION OR GANGRENE: ICD-10-CM

## 2023-03-03 PROCEDURE — 99204 OFFICE O/P NEW MOD 45 MIN: CPT | Performed by: SURGERY

## 2023-03-03 RX ORDER — SODIUM, POTASSIUM,MAG SULFATES 17.5-3.13G
177 SOLUTION, RECONSTITUTED, ORAL ORAL SEE ADMIN INSTRUCTIONS
Qty: 1 KIT | Refills: 0 | Status: SHIPPED | OUTPATIENT
Start: 2023-03-03

## 2023-03-03 NOTE — LETTER
3/6/2023    Patient: Christiane Joe   YOB: 1970   Date of Visit: 3/3/2023     David Green MD  [address] [place] [address]  [address] [facility] [address]    Dear David Green MD,      Thank you for referring Ms. Jennifer Rivera to [facility] [address] [facility] for evaluation. My notes for this consultation are attached. If you have questions, please do not hesitate to call me. I look forward to following your patient along with you.       Sincerely,    Nadine Cleveland MD

## 2023-03-03 NOTE — PROGRESS NOTES
Identified pt with two pt identifiers (name and ). Reviewed chart in preparation for visit and have obtained necessary documentation. Néstor Gleason is a 46 y.o. female  Chief Complaint   Patient presents with    New Patient     Ventral Hernia     Visit Vitals  /77 (BP 1 Location: Left upper arm, BP Patient Position: Sitting, BP Cuff Size: Adult long)   Pulse 60   Temp 98.1 °F (36.7 °C) (Oral)   Resp 18   Ht 5' 5\" (1.651 m)   Wt 246 lb 9.6 oz (111.9 kg)   SpO2 98%   BMI 41.04 kg/m²       1. Have you been to the ER, urgent care clinic since your last visit? Hospitalized since your last visit? No    2. Have you seen or consulted any other health care providers outside of the [address] [place] [address] since your last visit? Include any pap smears or colon screening.  No

## 2023-03-06 PROBLEM — K43.9 VENTRAL HERNIA WITHOUT OBSTRUCTION OR GANGRENE: Status: ACTIVE | Noted: 2023-03-06

## 2023-03-06 PROBLEM — E04.2 GOITER, NONTOXIC, MULTINODULAR: Status: ACTIVE | Noted: 2023-02-10

## 2023-03-06 NOTE — ASSESSMENT & PLAN NOTE
Non toxic multinodular goiter with multiple cysts larger than 1 cm. Needs FNA to better evaluate.   Will set up

## 2023-03-06 NOTE — PROGRESS NOTES
Subjective    Patient ID: Miguel Mike is a 46 y.o. female. Chief Complaint   Patient presents with    New Patient     Ventral Hernia     HPI Comments: Miguel Mike presents today for evaluation and treatment of multinodular goiter and ventral hernia. Hernia has been here for some time, but gotten worse recently. Occasionally sees a bulge. Painful when present. The is between her pfannenstiel incision and umbilicus. No change in bowels or bladder. She does have a goiter as well. No recent weight loss or gain, no hair loss or skin changes. No mood changes, no heat or cold intolerance. Last US a few weeks ago. Never had a biopsy    No Known Allergies    Current Outpatient Medications:     sodium-potassium-mag sulfate (Suprep Bowel Prep Kit) 17.5-3.13-1.6 gram solr oral solution, Take 177 mL by mouth See Admin Instructions. , Disp: 1 Kit, Rfl: 0    ibuprofen (MOTRIN) 600 mg tablet, TAKE 1 TABLET BY MOUTH THREE TIMES DAILY AFTER A MEAL, Disp: , Rfl:     ondansetron hcl (Zofran) 4 mg tablet, Take 1 Tablet by mouth every eight (8) hours as needed for Nausea or Vomiting. (Patient not taking: No sig reported), Disp: 20 Tablet, Rfl: 0    gabapentin (NEURONTIN) 300 mg capsule, TAKE 1 CAPSULE AT HOUR OF SLEEP (Patient not taking: No sig reported), Disp: , Rfl:   Past Medical History:   Diagnosis Date    UTI (urinary tract infection)      Past Surgical History:   Procedure Laterality Date    HX BREAST REDUCTION Bilateral 1988    HX HYSTERECTOMY  1993    [place] UNLISTED PROCEDURE BREAST  1998    breast reduction    [place] UNLISTED PROCEDURE FOOT/TOES       Social History     Tobacco Use    Smoking status: Never    Smokeless tobacco: Never   Vaping Use    Vaping Use: Never used   Substance Use Topics    Alcohol use: Yes    Drug use: Never     Family History   Problem Relation Age of Onset    Cancer Maternal Aunt     Breast Cancer Maternal Aunt         M.  Aunt diagnosed in her early 63's    Heart Disease Maternal Grandmother     Cancer Maternal Grandmother     Breast Cancer Maternal Grandmother         Age at diagnosis unknown         Review of Systems   Constitutional:  Negative for chills and fever. HENT:  Negative for congestion and sore throat. Respiratory:  Negative for cough, shortness of breath and wheezing. Cardiovascular:  Negative for chest pain and palpitations. Gastrointestinal:  Negative for abdominal pain, blood in stool, constipation, diarrhea, nausea and vomiting. Musculoskeletal:  Negative for joint pain and myalgias. Neurological:  Negative for weakness. Endo/Heme/Allergies:  Does not bruise/bleed easily. Psychiatric/Behavioral:  Negative for depression and suicidal ideas. Objective    Visit Vitals  /77 (BP 1 Location: Left upper arm, BP Patient Position: Sitting, BP Cuff Size: Adult long)   Pulse 60   Temp 98.1 °F (36.7 °C) (Oral)   Resp 18   Ht 5' 5\" (1.651 m)   Wt 246 lb 9.6 oz (111.9 kg)   SpO2 98%   BMI 41.04 kg/m²      Physical Exam  Constitutional:       General: She is not in acute distress. Appearance: Normal appearance. She is not ill-appearing or toxic-appearing. HENT:      Head: Normocephalic and atraumatic. Eyes:      Conjunctiva/sclera: Conjunctivae normal.      Pupils: Pupils are equal, round, and reactive to light. Neck:      Comments: goiter  Cardiovascular:      Rate and Rhythm: Normal rate and regular rhythm. Pulmonary:      Effort: Pulmonary effort is normal. No respiratory distress. Abdominal:      General: There is no distension. Palpations: Abdomen is soft. Hernia: A hernia is present. Comments: Ventral hernia. Near pfannensteil incision. Reducible. Musculoskeletal:         General: No swelling. Skin:     General: Skin is warm and dry. Neurological:      General: No focal deficit present. Mental Status: She is alert and oriented to person, place, and time.    Psychiatric:         Mood and Affect: Mood normal. Thought Content: Thought content normal.         Judgment: Judgment normal.        Problem List Items Addressed This Visit          Endocrine    Goiter, nontoxic, multinodular     Non toxic multinodular goiter with multiple cysts larger than 1 cm. Needs FNA to better evaluate. Will set up              Other    Ventral hernia without obstruction or gangrene     Given symptomatic nature needs repair. I discussed the repair options at length including laparoscopic versus robotic versus open and the benefits and drawbacks of both including but not limited to recurrence rates, pain, out of work time and durability. Understanding was expressed and we will proceed with a robotic ventral  hernia repair. I then discussed the intended procedure as well as alternative treatments including doing nothing. I discussed the risks of surgery at length including but not limited to bleeding, infection, recurrence, damage to bowel, bladder, vessels or solid organs, scarring, chronic pain, need for repeat or larger surgery as well as general risks of surgery including pneumonia, clots in the lungs or legs, heart attack, and death. I answered all questions related to the surgery. After questions were answered, understanding was verbalized. Also discussed returning versus going to the ED if pain worsens or the hernia becomes incarcerated. This was explained to mean if it no longer is able to be pushed back in. Other Visit Diagnoses       Non-toxic multinodular goiter    -  Primary    Relevant Orders    US GUIDE ASP THYROID CYST             I, Gudelia Kwan., MD personally performed the services described in this document. This documentation was facilitated by voice recognition software and may contain inadvertent typographical errors. If there are substantial concerns about the content of this note that may affect patient care, please contact me for clarification.

## 2023-03-06 NOTE — ASSESSMENT & PLAN NOTE
Given symptomatic nature needs repair. I discussed the repair options at length including laparoscopic versus robotic versus open and the benefits and drawbacks of both including but not limited to recurrence rates, pain, out of work time and durability. Understanding was expressed and we will proceed with a robotic ventral  hernia repair. I then discussed the intended procedure as well as alternative treatments including doing nothing. I discussed the risks of surgery at length including but not limited to bleeding, infection, recurrence, damage to bowel, bladder, vessels or solid organs, scarring, chronic pain, need for repeat or larger surgery as well as general risks of surgery including pneumonia, clots in the lungs or legs, heart attack, and death. I answered all questions related to the surgery. After questions were answered, understanding was verbalized. Also discussed returning versus going to the ED if pain worsens or the hernia becomes incarcerated. This was explained to mean if it no longer is able to be pushed back in.

## 2023-03-06 NOTE — H&P (VIEW-ONLY)
Subjective    Patient ID: Sherry Cantu is a 46 y.o. female. Chief Complaint   Patient presents with    New Patient     Ventral Hernia     HPI Comments: Sherry Cantu presents today for evaluation and treatment of multinodular goiter and ventral hernia. Hernia has been here for some time, but gotten worse recently. Occasionally sees a bulge. Painful when present. The is between her pfannenstiel incision and umbilicus. No change in bowels or bladder. She does have a goiter as well. No recent weight loss or gain, no hair loss or skin changes. No mood changes, no heat or cold intolerance. Last US a few weeks ago. Never had a biopsy    No Known Allergies    Current Outpatient Medications:     sodium-potassium-mag sulfate (Suprep Bowel Prep Kit) 17.5-3.13-1.6 gram solr oral solution, Take 177 mL by mouth See Admin Instructions. , Disp: 1 Kit, Rfl: 0    ibuprofen (MOTRIN) 600 mg tablet, TAKE 1 TABLET BY MOUTH THREE TIMES DAILY AFTER A MEAL, Disp: , Rfl:     ondansetron hcl (Zofran) 4 mg tablet, Take 1 Tablet by mouth every eight (8) hours as needed for Nausea or Vomiting. (Patient not taking: No sig reported), Disp: 20 Tablet, Rfl: 0    gabapentin (NEURONTIN) 300 mg capsule, TAKE 1 CAPSULE AT HOUR OF SLEEP (Patient not taking: No sig reported), Disp: , Rfl:   Past Medical History:   Diagnosis Date    UTI (urinary tract infection)      Past Surgical History:   Procedure Laterality Date    HX BREAST REDUCTION Bilateral 1988    HX HYSTERECTOMY  1993    [place] UNLISTED PROCEDURE BREAST  1998    breast reduction    IN UNLISTED PROCEDURE FOOT/TOES       Social History     Tobacco Use    Smoking status: Never    Smokeless tobacco: Never   Vaping Use    Vaping Use: Never used   Substance Use Topics    Alcohol use: Yes    Drug use: Never     Family History   Problem Relation Age of Onset    Cancer Maternal Aunt     Breast Cancer Maternal Aunt         M.  Aunt diagnosed in her early 63's    Heart Disease Maternal Grandmother     Cancer Maternal Grandmother     Breast Cancer Maternal Grandmother         Age at diagnosis unknown         Review of Systems   Constitutional:  Negative for chills and fever. HENT:  Negative for congestion and sore throat. Respiratory:  Negative for cough, shortness of breath and wheezing. Cardiovascular:  Negative for chest pain and palpitations. Gastrointestinal:  Negative for abdominal pain, blood in stool, constipation, diarrhea, nausea and vomiting. Musculoskeletal:  Negative for joint pain and myalgias. Neurological:  Negative for weakness. Endo/Heme/Allergies:  Does not bruise/bleed easily. Psychiatric/Behavioral:  Negative for depression and suicidal ideas. Objective    Visit Vitals  /77 (BP 1 Location: Left upper arm, BP Patient Position: Sitting, BP Cuff Size: Adult long)   Pulse 60   Temp 98.1 °F (36.7 °C) (Oral)   Resp 18   Ht 5' 5\" (1.651 m)   Wt 246 lb 9.6 oz (111.9 kg)   SpO2 98%   BMI 41.04 kg/m²      Physical Exam  Constitutional:       General: She is not in acute distress. Appearance: Normal appearance. She is not ill-appearing or toxic-appearing. HENT:      Head: Normocephalic and atraumatic. Eyes:      Conjunctiva/sclera: Conjunctivae normal.      Pupils: Pupils are equal, round, and reactive to light. Neck:      Comments: goiter  Cardiovascular:      Rate and Rhythm: Normal rate and regular rhythm. Pulmonary:      Effort: Pulmonary effort is normal. No respiratory distress. Abdominal:      General: There is no distension. Palpations: Abdomen is soft. Hernia: A hernia is present. Comments: Ventral hernia. Near pfannensteil incision. Reducible. Musculoskeletal:         General: No swelling. Skin:     General: Skin is warm and dry. Neurological:      General: No focal deficit present. Mental Status: She is alert and oriented to person, place, and time.    Psychiatric:         Mood and Affect: Mood normal. Thought Content: Thought content normal.         Judgment: Judgment normal.        Problem List Items Addressed This Visit          Endocrine    Goiter, nontoxic, multinodular     Non toxic multinodular goiter with multiple cysts larger than 1 cm. Needs FNA to better evaluate. Will set up              Other    Ventral hernia without obstruction or gangrene     Given symptomatic nature needs repair. I discussed the repair options at length including laparoscopic versus robotic versus open and the benefits and drawbacks of both including but not limited to recurrence rates, pain, out of work time and durability. Understanding was expressed and we will proceed with a robotic ventral  hernia repair. I then discussed the intended procedure as well as alternative treatments including doing nothing. I discussed the risks of surgery at length including but not limited to bleeding, infection, recurrence, damage to bowel, bladder, vessels or solid organs, scarring, chronic pain, need for repeat or larger surgery as well as general risks of surgery including pneumonia, clots in the lungs or legs, heart attack, and death. I answered all questions related to the surgery. After questions were answered, understanding was verbalized. Also discussed returning versus going to the ED if pain worsens or the hernia becomes incarcerated. This was explained to mean if it no longer is able to be pushed back in. Colon cancer screening     She has never had a colonoscopy and I would like to have this done prior to her hernia repair. I discussed the intended procedure as well as alternative treatments including doing nothing. I discussed the risks of the procedure including but not limited to bleeding, perforation, aspiration, and damage to surrounding structures. I answered all questions related to the procedure. Understanding was verbalized and we will proceed as planned.                   Other Visit Diagnoses       Non-toxic multinodular goiter    -  Primary    Relevant Orders    US GUIDE ASP THYROID CYST             I, Jahaira Santos MD personally performed the services described in this document. This documentation was facilitated by voice recognition software and may contain inadvertent typographical errors. If there are substantial concerns about the content of this note that may affect patient care, please contact me for clarification.

## 2023-03-14 ENCOUNTER — HOSPITAL ENCOUNTER (OUTPATIENT)
Age: 53
Setting detail: OUTPATIENT SURGERY
End: 2023-03-14
Attending: SURGERY | Admitting: SURGERY

## 2023-03-16 PROBLEM — Z12.11 COLON CANCER SCREENING: Status: ACTIVE | Noted: 2023-03-16

## 2023-03-16 RX ORDER — SODIUM CHLORIDE 0.9 % (FLUSH) 0.9 %
5-40 SYRINGE (ML) INJECTION EVERY 8 HOURS
Status: CANCELLED | OUTPATIENT
Start: 2023-03-16

## 2023-03-16 RX ORDER — SODIUM CHLORIDE 0.9 % (FLUSH) 0.9 %
5-40 SYRINGE (ML) INJECTION AS NEEDED
Status: CANCELLED | OUTPATIENT
Start: 2023-03-16

## 2023-03-24 ENCOUNTER — HOSPITAL ENCOUNTER (OUTPATIENT)
Dept: PREADMISSION TESTING | Age: 53
Discharge: HOME | End: 2023-03-24
Payer: COMMERCIAL

## 2023-03-24 ENCOUNTER — HOSPITAL ENCOUNTER (OUTPATIENT)
Dept: ULTRASOUND IMAGING | Age: 53
Discharge: HOME | End: 2023-03-24
Attending: SURGERY
Payer: COMMERCIAL

## 2023-03-24 ENCOUNTER — ANESTHESIA EVENT (OUTPATIENT)
Dept: SURGERY | Age: 53
End: 2023-03-24
Payer: COMMERCIAL

## 2023-03-24 VITALS
HEIGHT: 65 IN | SYSTOLIC BLOOD PRESSURE: 117 MMHG | BODY MASS INDEX: 41.4 KG/M2 | DIASTOLIC BLOOD PRESSURE: 80 MMHG | WEIGHT: 248.46 LBS | TEMPERATURE: 98 F | HEART RATE: 86 BPM

## 2023-03-24 DIAGNOSIS — E04.2 NON-TOXIC MULTINODULAR GOITER: ICD-10-CM

## 2023-03-24 PROCEDURE — 10005 FNA BX W/US GDN 1ST LES: CPT

## 2023-03-24 PROCEDURE — 74011000250 HC RX REV CODE- 250

## 2023-03-24 RX ORDER — LIDOCAINE HYDROCHLORIDE 10 MG/ML
10 INJECTION, SOLUTION EPIDURAL; INFILTRATION; INTRACAUDAL; PERINEURAL
Status: COMPLETED | OUTPATIENT
Start: 2023-03-24 | End: 2023-03-24

## 2023-03-24 RX ADMIN — LIDOCAINE HYDROCHLORIDE 10 ML: 10 INJECTION, SOLUTION EPIDURAL; INFILTRATION; INTRACAUDAL; PERINEURAL at 10:00

## 2023-03-24 NOTE — PERIOP NOTES
[address] INSTRUCTIONS    Surgery Date:   04/04/2023    Your surgeon's office or [facility] staff will call you between 4 PM- 8 PM the day before surgery with your arrival time. If your surgery is on a Monday, you will receive a call the preceding Friday. Please report to [facility] Patient Access/Admitting on the 1st floor. Bring your insurance card, photo identification, and any copayment ( if applicable). If you are going home the same day of your surgery, you must have a responsible adult to drive you home. You need to have a responsible adult to stay with you the first 24 hours after surgery and you should not drive a car for 24 hours following your surgery. Do NOT eat any solid foods after midnight the night before surgery including candy, mint or gum. You may drink clear liquids from midnight until 1 hour prior to your arrival. You may drink up to 12 ounces at one time every 4 hours. Please note special instructions, if applicable, below for medications. Do NOT drink alcohol or smoke 24 hours before surgery. STOP smoking for 14 days prior as it helps with breathing and healing after surgery. If you are being admitted to the hospital, please leave personal belongings/luggage in your car until you have an assigned hospital room number. Please wear comfortable clothes. Wear your glasses instead of contacts. We ask that all money, jewelry and valuables be left at home. Wear no make up, particularly mascara, the day of surgery. All body piercings, rings, and jewelry need to be removed and left at home. Please remove any nail polish or artifical nails from your fingernails. Please wear your hair loose or down. Please no pony-tails, buns, or any metal hair accessories. If you shower the morning of surgery, please do not apply any lotions or powders afterwards. You may wear deodorant, unless having breast surgery.   Do not shave any body area within 24 hours of your surgery. Please follow all instructions to avoid any potential surgical cancellation. Should your physical condition change, (i.e. fever, cold, flu, etc.) please notify your surgeon as soon as possible. It is important to be on time. If a situation occurs where you may be delayed, please call:  [phone] / [phone] on the day of surgery. The Preadmission Testing staff can be reached at [phone]. Special instructions: N/A      Current Outpatient Medications   Medication Sig    ibuprofen (MOTRIN) 600 mg tablet Take 600 mg by mouth as needed. sodium-potassium-mag sulfate (Suprep Bowel Prep Kit) 17.5-3.13-1.6 gram solr oral solution Take 177 mL by mouth See Admin Instructions. (Patient not taking: Reported on 3/24/2023)     No current facility-administered medications for this encounter. YOU MUST ONLY TAKE THESE MEDICATIONS THE MORNING OF SURGERY WITH A SIP OF WATER: N/A  MEDICATIONS TO TAKE THE MORNING OF SURGERY ONLY IF NEEDED: N/A  HOLD these prescription medications BEFORE Surgery: N/A  Ask your surgeon/prescribing physician about when/if to STOP taking these medications: N/A  Stop all vitamins, herbal medicines and Aspirin containing products 7 days prior to surgery. Stop any non-steroidal anti-inflammatory drugs (i.e. Ibuprofen, Naproxen, Advil, Aleve) 3 days before surgery. You may take Tylenol. If you are currently taking Plavix, Coumadin,or any other blood-thinning/anticoagulant medication contact your prescribing physician for instructions. Eating and Drinking Before Surgery    You may eat a regular dinner at the usual time on the day before your surgery. Do NOT eat any solid foods after midnight unless your arrival time at the hospital is 3pm or later. You may drink clear liquids only from 12 midnight until 1 hours prior to your arrival time at the hospital on the day of your surgery. Do NOT drink alcohol.   Clear liquids include:  Water  Fruit juices without pulp( i.e. apple juice)  Carbonated beverages  Black coffee (no cream/milk)  Tea (no cream/milk)  Gatorade  You may drink up to 12-16 ounces at one time every 4 hours between the hours of midnight and 1 hour before your arrival time at the hospital. Example- if your arrival time at the hospital is 6am, you may drink 12-16 ounces of clear liquids no later than 5am.  If your arrival time at the hospital is 3pm or later, you may eat a light breakfast before 8am.  A light breakfast includes: Toast or bagel (no butter)  Black coffee (no cream/milk)  Tea (no cream/milk)  Fruit juices without pulp ( i.e. apple juice)  Do NOT eat meat, eggs, vegetables or fruit  If you have any questions, please contact your surgeon's office. Preventing Infections Before and After - Your Surgery    IMPORTANT INSTRUCTIONS    You play an important role in your health and preparation for surgery. To reduce the germs on your skin you will need to shower with CHG soap (Chorhexidine gluconate 4%) two times before surgery. CHG soap (Hibiclens, Hex-A-Clens or store brand)  CHG soap will be provided at your Preadmission Testing (PAT) appointment. If you do not have a PAT appointment before surgery, you may arrange to  CHG soap from our office or purchase CHG soap at a pharmacy, grocery or department store. You need to purchase TWO 4 ounce bottles to use for your 2 showers. Steps to follow:  Debi Hilding your hair with your normal shampoo and your body with regular soap and rinse well to remove shampoo and soap from your skin. Wet a clean washcloth and turn off the shower. Put CHG soap on washcloth and apply to your entire body from the neck down. Do not use on your head, face or private parts(genitals). Do not use CHG soap on open sores, wounds or areas of skin irritation. Wash you body gently for 5 minutes. Do not wash your skin too hard. This soap does not create lather.  Pay special attention to your underarms and from your belly button to your feet. Turn the shower back on and rinse well to get CHG soap off your body. Pat your skin dry with a clean, dry towel. Do not apply lotions or moisturizer. Put on clean clothes and sleep on fresh bed sheets and do not allow pets to sleep with you. Shower with CHG soap 2 times before your surgery  The evening before your surgery  The morning of your surgery      Tips to help prevent infections after your surgery:  Protect your surgical wound from germs:  Hand washing is the most important thing you and your caregivers can do to prevent infections. Keep your bandage clean and dry! Do not touch your surgical wound. Use clean, freshly washed towels and washcloths every time you shower; do not share bath linens with others. Until your surgical wound is healed, wear clothing and sleep on bed linens each day that are clean. Do not allow pets to sleep in your bed with you or touch your surgical wound. Do not smoke - smoking delays wound healing. This may be a good time to stop smoking. If you have diabetes, it is important for you to manage your blood sugar levels properly before your surgery as well as after your surgery. Poorly managed blood sugar levels slow down wound healing and prevent you from healing completely. Patient Information Regarding COVID Restrictions      Day of Procedure    Please park in the parking deck or any designated visitor parking lot. Enter the facility through the Main Entrance of the hospital.  On the day of surgery, please provide the cell phone number of the person who will be waiting for you to the Patient Access representative at the time of registration. Masks are highly recommended in the hospital, but not required.   Once your procedure and the immediate recovery period is completed, a nurse in the recovery area will contact your designated visitor to inform them of your room number or to otherwise review other pertinent information regarding your care.    Social distancing practices are strongly encouraged in waiting areas and the cafeteria. The patient was contacted  in person. She verbalized understanding of all instructions does not  need reinforcement.

## 2023-03-29 ENCOUNTER — ANESTHESIA (OUTPATIENT)
Dept: SURGERY | Age: 53
End: 2023-03-29
Payer: COMMERCIAL

## 2023-03-29 ENCOUNTER — HOSPITAL ENCOUNTER (OUTPATIENT)
Age: 53
Setting detail: OUTPATIENT SURGERY
Discharge: HOME | End: 2023-03-29
Attending: SURGERY | Admitting: SURGERY
Payer: COMMERCIAL

## 2023-03-29 VITALS
WEIGHT: 250 LBS | HEIGHT: 65 IN | SYSTOLIC BLOOD PRESSURE: 119 MMHG | HEART RATE: 64 BPM | DIASTOLIC BLOOD PRESSURE: 81 MMHG | TEMPERATURE: 97.7 F | RESPIRATION RATE: 17 BRPM | OXYGEN SATURATION: 100 % | BODY MASS INDEX: 41.65 KG/M2

## 2023-03-29 PROCEDURE — 76210000020 HC REC RM PH II FIRST 0.5 HR: Performed by: SURGERY

## 2023-03-29 PROCEDURE — 45378 DIAGNOSTIC COLONOSCOPY: CPT | Performed by: SURGERY

## 2023-03-29 PROCEDURE — 76060000031 HC ANESTHESIA FIRST 0.5 HR: Performed by: SURGERY

## 2023-03-29 PROCEDURE — 2709999900 HC NON-CHARGEABLE SUPPLY: Performed by: SURGERY

## 2023-03-29 PROCEDURE — 76010000154 HC OR TIME FIRST 0.5 HR: Performed by: SURGERY

## 2023-03-29 PROCEDURE — 74011250636 HC RX REV CODE- 250/636: Performed by: ANESTHESIOLOGY

## 2023-03-29 RX ORDER — SODIUM CHLORIDE 0.9 % (FLUSH) 0.9 %
5-40 SYRINGE (ML) INJECTION EVERY 8 HOURS
Status: DISCONTINUED | OUTPATIENT
Start: 2023-03-29 | End: 2023-03-29 | Stop reason: HOSPADM

## 2023-03-29 RX ORDER — PROPOFOL 10 MG/ML
INJECTION, EMULSION INTRAVENOUS AS NEEDED
Status: DISCONTINUED | OUTPATIENT
Start: 2023-03-29 | End: 2023-03-29 | Stop reason: HOSPADM

## 2023-03-29 RX ORDER — SODIUM CHLORIDE, SODIUM LACTATE, POTASSIUM CHLORIDE, CALCIUM CHLORIDE 600; 310; 30; 20 MG/100ML; MG/100ML; MG/100ML; MG/100ML
50 INJECTION, SOLUTION INTRAVENOUS CONTINUOUS
Status: DISCONTINUED | OUTPATIENT
Start: 2023-03-29 | End: 2023-03-29 | Stop reason: HOSPADM

## 2023-03-29 RX ORDER — SODIUM CHLORIDE 0.9 % (FLUSH) 0.9 %
5-40 SYRINGE (ML) INJECTION AS NEEDED
Status: DISCONTINUED | OUTPATIENT
Start: 2023-03-29 | End: 2023-03-29 | Stop reason: HOSPADM

## 2023-03-29 RX ORDER — SODIUM CHLORIDE, SODIUM LACTATE, POTASSIUM CHLORIDE, CALCIUM CHLORIDE 600; 310; 30; 20 MG/100ML; MG/100ML; MG/100ML; MG/100ML
INJECTION, SOLUTION INTRAVENOUS
Status: DISCONTINUED | OUTPATIENT
Start: 2023-03-29 | End: 2023-03-29 | Stop reason: HOSPADM

## 2023-03-29 RX ORDER — SODIUM CHLORIDE 9 MG/ML
50 INJECTION, SOLUTION INTRAVENOUS CONTINUOUS
Status: DISCONTINUED | OUTPATIENT
Start: 2023-03-29 | End: 2023-03-29 | Stop reason: HOSPADM

## 2023-03-29 RX ADMIN — SODIUM CHLORIDE, POTASSIUM CHLORIDE, SODIUM LACTATE AND CALCIUM CHLORIDE: 600; 310; 30; 20 INJECTION, SOLUTION INTRAVENOUS at 15:35

## 2023-03-29 RX ADMIN — PROPOFOL 10 MG: 10 INJECTION, EMULSION INTRAVENOUS at 16:10

## 2023-03-29 RX ADMIN — PROPOFOL 10 MG: 10 INJECTION, EMULSION INTRAVENOUS at 16:09

## 2023-03-29 RX ADMIN — PROPOFOL 10 MG: 10 INJECTION, EMULSION INTRAVENOUS at 16:17

## 2023-03-29 RX ADMIN — PROPOFOL 10 MG: 10 INJECTION, EMULSION INTRAVENOUS at 16:15

## 2023-03-29 RX ADMIN — PROPOFOL 10 MG: 10 INJECTION, EMULSION INTRAVENOUS at 16:18

## 2023-03-29 RX ADMIN — PROPOFOL 10 MG: 10 INJECTION, EMULSION INTRAVENOUS at 16:14

## 2023-03-29 RX ADMIN — PROPOFOL 10 MG: 10 INJECTION, EMULSION INTRAVENOUS at 16:19

## 2023-03-29 RX ADMIN — PROPOFOL 10 MG: 10 INJECTION, EMULSION INTRAVENOUS at 16:16

## 2023-03-29 RX ADMIN — PROPOFOL 10 MG: 10 INJECTION, EMULSION INTRAVENOUS at 16:11

## 2023-03-29 RX ADMIN — PROPOFOL 10 MG: 10 INJECTION, EMULSION INTRAVENOUS at 16:12

## 2023-03-29 RX ADMIN — PROPOFOL 100 MG: 10 INJECTION, EMULSION INTRAVENOUS at 16:06

## 2023-03-29 RX ADMIN — PROPOFOL 10 MG: 10 INJECTION, EMULSION INTRAVENOUS at 16:20

## 2023-03-29 RX ADMIN — PROPOFOL 20 MG: 10 INJECTION, EMULSION INTRAVENOUS at 16:07

## 2023-03-29 RX ADMIN — PROPOFOL 10 MG: 10 INJECTION, EMULSION INTRAVENOUS at 16:13

## 2023-03-29 RX ADMIN — PROPOFOL 10 MG: 10 INJECTION, EMULSION INTRAVENOUS at 16:08

## 2023-03-29 RX ADMIN — PROPOFOL 10 MG: 10 INJECTION, EMULSION INTRAVENOUS at 16:21

## 2023-03-29 NOTE — PERIOP NOTES
Handoff Report from Operating Room to PACU    Report received from Dr Bret Koenig and Kevin Comer regarding Miguel Mike. Surgeon(s):  Albert Choi MD  And Procedure(s) (LRB):  COLONOSCOPY (N/A)  confirmed   with allergies discussed. Anesthesia type, drugs, patient history, complications, estimated blood loss, vital signs, intake and output, and lines and temperature were reviewed.

## 2023-03-29 NOTE — PERIOP NOTES
Patient meets discharge criteria. Patient and Mother provided with verbal and written discharge instructions. Patient discharged by wheelchair with Mother to transport home.

## 2023-03-29 NOTE — DISCHARGE INSTRUCTIONS
DISCHARGE SUMMARY from Nurse    PATIENT INSTRUCTIONS:    After general anesthesia or intravenous sedation, for 24 hours or while taking prescription Narcotics:  Limit your activities  Do not drive and operate hazardous machinery  Do not make important personal or business decisions  Do  not drink alcoholic beverages  If you have not urinated within 8 hours after discharge, please contact your surgeon on call. Report the following to your surgeon:  Excessive pain, swelling, redness or odor of or around the surgical area  Temperature over 100.5  Nausea and vomiting lasting longer than 4 hours or if unable to take medications  Any signs of decreased circulation or nerve impairment to extremity: change in color, persistent  numbness, tingling, coldness or increase pain  Any questions    These are general instructions for a healthy lifestyle:    No smoking/ No tobacco products/ Avoid exposure to second hand smoke  Surgeon General's Warning:  Quitting smoking now greatly reduces serious risk to your health. Obesity, smoking, and sedentary lifestyle greatly increases your risk for illness    A healthy diet, regular physical exercise & weight monitoring are important for maintaining a healthy lifestyle    You may be retaining fluid if you have a history of heart failure or if you experience any of the following symptoms:  Weight gain of 3 pounds or more overnight or 5 pounds in a week, increased swelling in our hands or feet or shortness of breath while lying flat in bed. Please call your doctor as soon as you notice any of these symptoms; do not wait until your next office visit. The discharge information has been reviewed with the patient and Lucy. The patient and Lucy verbalized understanding.   Discharge medications reviewed with the patient and Lucy and appropriate educational materials and side effects teaching were provided.   ___________________________________________________________________________________________________________________________________

## 2023-03-29 NOTE — INTERVAL H&P NOTE
Update History & Physical    The Patient's History and Physical of March 3,   2023 was reviewed with the patient and I examined the patient. There was no change. The surgical site was confirmed by the patient and me. Plan:  The risk, benefits, expected outcome, and alternative to the recommended procedure have been discussed with the patient. Patient understands and wants to proceed with the procedure.     Electronically signed by Adele Nielsen MD on 3/29/2023 at 2:48 PM

## 2023-03-29 NOTE — ANESTHESIA PREPROCEDURE EVALUATION
Relevant Problems   No relevant active problems       Anesthetic History     PONV          Review of Systems / Medical History  Patient summary reviewed and pertinent labs reviewed    Pulmonary  Within defined limits                 Neuro/Psych   Within defined limits           Cardiovascular  Within defined limits                Exercise tolerance: >4 METS     GI/Hepatic/Renal  Within defined limits              Endo/Other        Morbid obesity and arthritis     Other Findings              Physical Exam    Airway  Mallampati: I  TM Distance: 4 - 6 cm  Neck ROM: normal range of motion   Mouth opening: Normal     Cardiovascular    Rhythm: regular  Rate: normal         Dental  No notable dental hx       Pulmonary  Breath sounds clear to auscultation               Abdominal  GI exam deferred       Other Findings            Anesthetic Plan    ASA: 2  Anesthesia type: MAC            Anesthetic plan and risks discussed with: Patient

## 2023-03-29 NOTE — ANESTHESIA POSTPROCEDURE EVALUATION
Procedure(s):  COLONOSCOPY. MAC    Anesthesia Post Evaluation      Multimodal analgesia: multimodal analgesia not used between 6 hours prior to anesthesia start to PACU discharge  Patient location during evaluation: PACU  Patient participation: waiting for patient participation  Level of consciousness: awake and alert  Pain management: adequate  Airway patency: patent  Anesthetic complications: no  Cardiovascular status: acceptable  Respiratory status: acceptable  Hydration status: acceptable  Post anesthesia nausea and vomiting:  none  Final Post Anesthesia Temperature Assessment:  Normothermia (36.0-37.5 degrees C)      INITIAL Post-op Vital signs:   Vitals Value Taken Time   /80 03/29/23 1645   Temp 36.6 °C (97.8 °F) 03/29/23 1632   Pulse 68 03/29/23 1649   Resp 18 03/29/23 1649   SpO2 100 % 03/29/23 1649   Vitals shown include unvalidated device data.

## 2023-03-29 NOTE — OP NOTES
[facility]   Endoscopic Colonoscopy Procedure Note       Patient: Sigrid Monreal  YOB: 1970  MRN: [id number]    Date of Procedure: 3/29/2023    Indication:    Preoperative Diagnosis: COLON SCREENING     Postoperative Diagnosis: colon screening     Surgeon(s) and Role:     Pattie Godinez MD - Primary    Assistants: none    Anesthesia: MAC    Estimated Blood Loss (mL): Minimal    Complications:  none    Specimens:   [unfilled]    Detailed Description of Procedure:   After informed consent was obtained with all risks and benefits of procedure explained and preoperative exam completed, the patient was taken to the endoscopy suite and placed in the left lateral decubitus position. Upon sequential sedation as per above, the [address]  was inserted in the rectum and carefully advanced to the cecum, which was identified by the ileocecal valve and appendiceal orifice, terminal ileum. The quality of preparation was good. The colonoscope was slowly withdrawn with careful evaluation between folds. Retroflexion in the rectum was performed and was normal.  Findings:     Rectum: normal  Sigmoid: normal  Descending Colon: normal  Transverse Colon: normal  Ascending Colon: normal  Cecum: normal  Terminal Ileum: normal    Impression: normal colonoscopy    Recommendations: repeat in 10 years    Electronically signed by Jasen eSrna MD on 3/29/2023 at 4:28 PM     This documentation was facilitated by voice recognition software and may contain inadvertent typographical errors. If there are substantial concerns about the content of this note that may affect patient care, please contact me for clarification.

## 2023-04-15 PROBLEM — Z12.11 COLON CANCER SCREENING: Status: RESOLVED | Noted: 2023-03-16 | Resolved: 2023-04-15

## (undated) DEVICE — TOWEL 4 PLY TISS 19X30 SUE WHT

## (undated) DEVICE — ELECTRODE,RADIOTRANSLUCENT,FOAM,5PK: Brand: MEDLINE

## (undated) DEVICE — KIT,1200CC CANISTER,3/16"X6' TUBING: Brand: MEDLINE INDUSTRIES, INC.

## (undated) DEVICE — CANNULA CUSH AD W/ 14FT TBG